# Patient Record
Sex: FEMALE | Race: WHITE | NOT HISPANIC OR LATINO | Employment: OTHER | URBAN - NONMETROPOLITAN AREA
[De-identification: names, ages, dates, MRNs, and addresses within clinical notes are randomized per-mention and may not be internally consistent; named-entity substitution may affect disease eponyms.]

---

## 2018-11-21 ENCOUNTER — HISTORICAL (OUTPATIENT)
Dept: ADMINISTRATIVE | Facility: HOSPITAL | Age: 58
End: 2018-11-21

## 2019-01-18 ENCOUNTER — HISTORICAL (OUTPATIENT)
Dept: ADMINISTRATIVE | Facility: HOSPITAL | Age: 59
End: 2019-01-18

## 2019-03-22 ENCOUNTER — HISTORICAL (OUTPATIENT)
Dept: ADMINISTRATIVE | Facility: HOSPITAL | Age: 59
End: 2019-03-22

## 2019-10-11 ENCOUNTER — HISTORICAL (OUTPATIENT)
Dept: ADMINISTRATIVE | Facility: HOSPITAL | Age: 59
End: 2019-10-11

## 2019-10-14 ENCOUNTER — HISTORICAL (OUTPATIENT)
Dept: ADMINISTRATIVE | Facility: HOSPITAL | Age: 59
End: 2019-10-14

## 2020-08-18 ENCOUNTER — HISTORICAL (OUTPATIENT)
Dept: ADMINISTRATIVE | Facility: HOSPITAL | Age: 60
End: 2020-08-18

## 2020-09-25 ENCOUNTER — HISTORICAL (OUTPATIENT)
Dept: ADMINISTRATIVE | Facility: HOSPITAL | Age: 60
End: 2020-09-25

## 2021-08-20 ENCOUNTER — HISTORICAL (OUTPATIENT)
Dept: ADMINISTRATIVE | Facility: HOSPITAL | Age: 61
End: 2021-08-20

## 2021-10-21 ENCOUNTER — HISTORICAL (OUTPATIENT)
Dept: ADMINISTRATIVE | Facility: HOSPITAL | Age: 61
End: 2021-10-21

## 2022-11-02 ENCOUNTER — HOSPITAL ENCOUNTER (OUTPATIENT)
Dept: WOUND CARE | Facility: HOSPITAL | Age: 62
Discharge: HOME | End: 2022-11-02
Attending: FAMILY MEDICINE
Payer: MEDICARE

## 2022-11-02 DIAGNOSIS — T63.301D SPIDER BITE WOUND, ACCIDENTAL OR UNINTENTIONAL, SUBSEQUENT ENCOUNTER: Primary | ICD-10-CM

## 2022-11-02 PROCEDURE — 10060 I&D ABSCESS SIMPLE/SINGLE: CPT

## 2022-11-02 PROCEDURE — 11042 DBRDMT SUBQ TIS 1ST 20SQCM/<: CPT

## 2022-11-02 PROCEDURE — 27000999 HC MEDICAL RECORD PHOTO DOCUMENTATION

## 2022-11-02 PROCEDURE — 99213 OFFICE O/P EST LOW 20 MIN: CPT | Mod: 25

## 2022-11-02 RX ORDER — LAMOTRIGINE 150 MG/1
TABLET ORAL
COMMUNITY
Start: 2022-10-25

## 2022-11-02 RX ORDER — CLINDAMYCIN HYDROCHLORIDE 300 MG/1
CAPSULE ORAL
COMMUNITY
Start: 2022-10-31 | End: 2022-11-09 | Stop reason: ALTCHOICE

## 2022-11-02 RX ORDER — BUSPIRONE HYDROCHLORIDE 15 MG/1
15 TABLET ORAL 3 TIMES DAILY
COMMUNITY
Start: 2022-10-25

## 2022-11-02 RX ORDER — ALBUTEROL SULFATE 90 UG/1
AEROSOL, METERED RESPIRATORY (INHALATION)
COMMUNITY

## 2022-11-02 RX ORDER — CITALOPRAM 40 MG/1
40 TABLET, FILM COATED ORAL EVERY MORNING
COMMUNITY
Start: 2022-10-25

## 2022-11-02 RX ORDER — SULFAMETHOXAZOLE AND TRIMETHOPRIM 800; 160 MG/1; MG/1
TABLET ORAL
COMMUNITY
Start: 2022-10-31 | End: 2022-11-09 | Stop reason: ALTCHOICE

## 2022-11-02 RX ORDER — SUCRALFATE 1 G/1
1 TABLET ORAL DAILY
COMMUNITY
Start: 2022-09-20

## 2022-11-02 RX ORDER — TRAZODONE HYDROCHLORIDE 100 MG/1
TABLET ORAL
COMMUNITY
Start: 2022-10-25

## 2022-11-02 RX ORDER — IBUPROFEN 800 MG/1
TABLET ORAL
COMMUNITY
Start: 2022-10-31

## 2022-11-02 RX ORDER — ARIPIPRAZOLE 2 MG/1
TABLET ORAL
COMMUNITY

## 2022-11-02 RX ORDER — OLANZAPINE 2.5 MG/1
TABLET ORAL
COMMUNITY

## 2022-11-02 NOTE — PROGRESS NOTES
Subjective:       Patient ID: Kayla Gillis is a 62 y.o. female.    Chief Complaint: Wound Care (Right wrist (spider bite))    HPI    61yo F with PMHx of GERD, COPD, depression & anxiety sent from PCP in [place] directly from clinic today for spider bite to right wrist.     Reports spider bite (not seen) while carrying wood a week ago. Patient has been on Bactrim DS & clindamycin with worsening pain and swelling.     Wound today with two distinct open wounds with yellow purulent material central to significant erythema and tenderness.    I&D performed connecting two puncture wounds - significant necrotic tissue/ slough removed. Will pack with hypertonic saline moistened gauze daily.     Review of Systems   Constitutional:  Negative for chills and fever.   Respiratory:  Negative for cough.    Cardiovascular:  Negative for chest pain.   Skin:  Positive for wound.       Objective:      Vitals:    11/03/22 0849   BP: 128/67   Pulse: 78   Resp: 18       Physical Exam  Vitals reviewed.   Constitutional:       Appearance: Normal appearance.   HENT:      Head: Normocephalic and atraumatic.   Eyes:      Extraocular Movements: Extraocular movements intact.      Pupils: Pupils are equal, round, and reactive to light.   Cardiovascular:      Rate and Rhythm: Normal rate.   Pulmonary:      Effort: Pulmonary effort is normal.   Skin:     Comments: Wound as described and pictured   Neurological:      Mental Status: She is alert.          Altered Skin Integrity 11/02/22 1454 Right Wrist #1 (Active)   11/02/22 1454   Altered Skin Integrity Present on Admission: yes   Side: Right   Orientation:    Location: Wrist   Wound Number: #1   Is this injury device related?: No   Primary Wound Type:    Description of Altered Skin Integrity:    Ankle-Brachial Index:    Pulses:    Removal Indication and Assessment:    Wound Outcome:    (Retired) Wound Length (cm):    (Retired) Wound Width (cm):    (Retired) Depth (cm):    Wound Description  (Comments):    Removal Indications:    Dressing Appearance Moist drainage 11/02/22 1454   Drainage Amount Moderate 11/02/22 1454   Drainage Characteristics/Odor Serosanguineous;Purulent 11/02/22 1454   Appearance Tan;Slough 11/02/22 1454   Tissue loss description Full thickness 11/02/22 1454   Periwound Area Intact;Swelling;Redness 11/02/22 1454   Wound Edges Open 11/02/22 1454   Wound Length (cm) 2.3 cm 11/02/22 1454   Wound Width (cm) 2.4 cm 11/02/22 1454   Wound Depth (cm) 0.2 cm 11/02/22 1454   Wound Volume (cm^3) 1.104 cm^3 11/02/22 1454   Wound Surface Area (cm^2) 5.52 cm^2 11/02/22 1454   Care Cleansed with:;Wound cleanser;Debrided 11/02/22 1454   Dressing Applied;Other (comment) 11/02/22 1454   Dressing Change Due 11/03/22 11/02/22 1454                Pre       Post        Assessment:         ICD-10-CM ICD-9-CM   1. Spider bite wound, accidental or unintentional, subsequent encounter  T63.301D V58.89     989.5           Procedures:           [x] Yes [] No   I & D performed  [] Yes [x] No   Excisional debridement performed  [] Yes [x] No   Selective debridement performed           [] Yes [x] No   Mechanical debridement performed         [] Yes [x] No  Silver nitrate applied                                     [] Yes [x] No  Labs ordered this visit                                  [] Yes [x] No   Imaging ordered this visit                           [] Yes [x] No   Tissue pathology and/or culture taken       Incision and Drainage    Date/Time: 11/2/2022 2:20 PM  Performed by: Adali Ribeiro MD  Authorized by: Adali Ribeiro MD   Consent Done: Not Needed  Type: abscess  Body area: upper extremity  Location details: right wrist    Anesthesia:  Local Anesthetic: topical anesthetic and lidocaine 2% without epinephrine    Patient sedated: no  Incision type: single straight  Complexity: simple  Drainage characteristics: necrotic tissue, slough.  Wound treatment: wound packed  Packing material: 1/4 in  "gauze  Complications: No  Estimated blood loss (mL): 0  Specimens: No  Implants: No  Patient tolerance: Patient tolerated the procedure well with no immediate complications        MEDICATIONS    Current Outpatient Medications:     albuterol (PROVENTIL/VENTOLIN HFA) 90 mcg/actuation inhaler, Ventolin HFA 90 mcg/actuation aerosol inhaler, Disp: , Rfl:     ARIPiprazole (ABILIFY) 2 MG Tab, aripiprazole 2 mg tablet, Disp: , Rfl:     busPIRone (BUSPAR) 15 MG tablet, Take 15 mg by mouth 3 (three) times daily., Disp: , Rfl:     citalopram (CELEXA) 40 MG tablet, Take 40 mg by mouth every morning., Disp: , Rfl:     clindamycin (CLEOCIN) 300 MG capsule, 1 CAPSULE BY MOUTH THREE TIMES A DAY, Disp: , Rfl:     fluticasone-umeclidin-vilanter (TRELEGY ELLIPTA) 100-62.5-25 mcg DsDv, Inhale 1 puff into the lungs once daily., Disp: , Rfl:     ibuprofen (ADVIL,MOTRIN) 800 MG tablet, 1 TABLET BY MOUTH TWICE A DAY AS NEEDED FOR PAIN, Disp: , Rfl:     lamoTRIgine (LAMICTAL) 150 MG Tab, TAKE 2 TABLET AT BEDTIME, Disp: , Rfl:     OLANZapine (ZYPREXA) 2.5 MG tablet, olanzapine 2.5 mg tablet, Disp: , Rfl:     sucralfate (CARAFATE) 1 gram tablet, Take 1 g by mouth once daily., Disp: , Rfl:     sulfamethoxazole-trimethoprim 800-160mg (BACTRIM DS) 800-160 mg Tab, 1 BY MOUTH TWO TIMES A DAY X7 DAYS, Disp: , Rfl:     traZODone (DESYREL) 100 MG tablet, TAKE 2 OR 3 TABLET BY MOUTH EVERY NIGHT AS DIRECTED, Disp: , Rfl:  Review of patient's allergies indicates:  No Known Allergies    DIAGNOSTICS        HOME HEALTH AGENCY:  referred today  TIMES PER WEEK/DAYS:  daily  WOUND CARE ORDERS: Right wrist wound: Cleanse with hypertonic saline, pack with 1/2" hypertonic saline moistened packing strip, cover with bandage to be changed daily      Follow up in about 1 week (around 11/9/2022) for right wrist.            "

## 2022-11-03 VITALS
HEIGHT: 59 IN | RESPIRATION RATE: 18 BRPM | BODY MASS INDEX: 19.56 KG/M2 | HEART RATE: 78 BPM | DIASTOLIC BLOOD PRESSURE: 67 MMHG | SYSTOLIC BLOOD PRESSURE: 128 MMHG | WEIGHT: 97 LBS

## 2022-11-09 ENCOUNTER — HOSPITAL ENCOUNTER (OUTPATIENT)
Dept: WOUND CARE | Facility: HOSPITAL | Age: 62
Discharge: HOME | End: 2022-11-09
Attending: NURSE PRACTITIONER
Payer: MEDICARE

## 2022-11-09 VITALS
HEART RATE: 81 BPM | RESPIRATION RATE: 18 BRPM | DIASTOLIC BLOOD PRESSURE: 69 MMHG | SYSTOLIC BLOOD PRESSURE: 141 MMHG | HEIGHT: 59 IN | BODY MASS INDEX: 19.56 KG/M2 | WEIGHT: 97 LBS

## 2022-11-09 DIAGNOSIS — T63.301D SPIDER BITE WOUND, ACCIDENTAL OR UNINTENTIONAL, SUBSEQUENT ENCOUNTER: Primary | ICD-10-CM

## 2022-11-09 PROBLEM — T63.301A SPIDER BITE: Status: ACTIVE | Noted: 2022-11-09

## 2022-11-09 PROCEDURE — 99213 OFFICE O/P EST LOW 20 MIN: CPT

## 2022-11-09 PROCEDURE — 27000999 HC MEDICAL RECORD PHOTO DOCUMENTATION

## 2022-11-09 NOTE — PROCEDURES
"Debridement    Date/Time: 11/9/2022 12:43 PM  Performed by: Carla Victor NP  Authorized by: Carla Victor NP     Time out: Immediately prior to procedure a "time out" was called to verify the correct patient, procedure, equipment, support staff and site/side marked as required.    Consent Done?:  Yes (Verbal)  Local anesthesia used?: No      Wound Details:    Location:  Right hand (Wrist)    Type of Debridement:  Non-excisional       Length (cm):  1.1       Area (sq cm):  0.99       Width (cm):  0.9       Percent Debrided (%):  100       Depth (cm):  0.2       Total Area Debrided (sq cm):  0.99    Depth of debridement:  Epidermis/Dermis    Devitalized tissue debrided:  Biofilm, Callus, Exudate and Fibrin    Instruments:  Forceps, Scissors and Curette (Maame)     PILAR  "

## 2022-11-09 NOTE — PROGRESS NOTES
Subjective:       Patient ID: Kayla Gillis is a 62 y.o. female.    Chief Complaint: Wound Care (Right wrist (spider bite))    HPI    61yo F with PMHx of GERD, COPD, depression & anxiety sent from PCP in [place] directly from clinic today for spider bite to right wrist.     Reports spider bite (not seen) while carrying wood a week ago. Patient has completed Bactrim DS & clindamycin PO abx.    Today the wound was selectively debrided.  The wound bed does have thickened layer of slough that was not easily removed.  We will begin laying mesalt onto the wound bed to assist with autolytic debridement.  If that does not resolve, and breakthrough to the granular tissue, we will consider adding Santyl to the wound orders.    Review of Systems   Constitutional:  Negative for chills and fever.   Respiratory:  Negative for cough.    Cardiovascular:  Negative for chest pain.   Skin:  Positive for wound.       Objective:      Vitals:    11/09/22 1316   BP: (!) 141/69   Pulse: 81   Resp: 18       Physical Exam  Vitals reviewed.   Constitutional:       Appearance: Normal appearance.   HENT:      Head: Normocephalic and atraumatic.   Eyes:      Extraocular Movements: Extraocular movements intact.      Pupils: Pupils are equal, round, and reactive to light.   Cardiovascular:      Rate and Rhythm: Normal rate.   Pulmonary:      Effort: Pulmonary effort is normal.   Skin:     Comments: Wound as described and pictured   Neurological:      Mental Status: She is alert.          Altered Skin Integrity 11/02/22 1454 Right Wrist #1 (Active)   11/02/22 1454   Altered Skin Integrity Present on Admission: yes   Side: Right   Orientation:    Location: Wrist   Wound Number: #1   Is this injury device related?: No   Primary Wound Type:    Description of Altered Skin Integrity:    Ankle-Brachial Index:    Pulses:    Removal Indication and Assessment:    Wound Outcome:    (Retired) Wound Length (cm):    (Retired) Wound Width (cm):    (Retired) Depth  "(cm):    Wound Description (Comments):    Removal Indications:    Dressing Appearance Dried drainage 11/09/22 1317   Drainage Amount Moderate 11/09/22 1317   Drainage Characteristics/Odor Serosanguineous 11/09/22 1317   Appearance Pink;Slough;Moist 11/09/22 1317   Tissue loss description Full thickness 11/09/22 1317   Periwound Area Intact 11/09/22 1317   Wound Edges Open 11/09/22 1317   Wound Length (cm) 1.1 cm 11/09/22 1317   Wound Width (cm) 0.9 cm 11/09/22 1317   Wound Depth (cm) 0.2 cm 11/09/22 1317   Wound Volume (cm^3) 0.198 cm^3 11/09/22 1317   Wound Surface Area (cm^2) 0.99 cm^2 11/09/22 1317   Care Cleansed with:;Wound cleanser 11/09/22 1317   Dressing Applied 11/09/22 1317   Dressing Change Due 11/10/22 11/09/22 1317                  Right wrist, Pre    Post  mesalt, calcium alginate, 4x4 gauze, kerlix, coban  ML    Assessment:         ICD-10-CM ICD-9-CM   1. Spider bite wound, accidental or unintentional, subsequent encounter  T63.301D V58.89     989.5           Procedures:     Debridement     Date/Time: 11/9/2022 12:43 PM  Performed by: Carla Victor NP  Authorized by: Carla Victor NP      Time out: Immediately prior to procedure a "time out" was called to verify the correct patient, procedure, equipment, support staff and site/side marked as required.     Consent Done?:  Yes (Verbal)  Local anesthesia used?: No       Wound Details:    Location:  Right hand (Wrist)    Type of Debridement:  Non-excisional       Length (cm):  1.1       Area (sq cm):  0.99       Width (cm):  0.9       Percent Debrided (%):  100       Depth (cm):  0.2       Total Area Debrided (sq cm):  0.99    Depth of debridement:  Epidermis/Dermis    Devitalized tissue debrided:  Biofilm, Callus, Exudate and Fibrin    Instruments:  Forceps, Scissors and Curette (Maame)      ML    [] Yes [x] No   I & D performed  [] Yes [x] No   Excisional debridement performed  [x] Yes [] No   Selective debridement performed           [] Yes " [x] No   Mechanical debridement performed         [] Yes [x] No  Silver nitrate applied                                     [] Yes [x] No  Labs ordered this visit                                  [] Yes [x] No   Imaging ordered this visit                           [] Yes [x] No   Tissue pathology and/or culture taken       MEDICATIONS    Current Outpatient Medications:     albuterol (PROVENTIL/VENTOLIN HFA) 90 mcg/actuation inhaler, Ventolin HFA 90 mcg/actuation aerosol inhaler, Disp: , Rfl:     ARIPiprazole (ABILIFY) 2 MG Tab, aripiprazole 2 mg tablet, Disp: , Rfl:     busPIRone (BUSPAR) 15 MG tablet, Take 15 mg by mouth 3 (three) times daily., Disp: , Rfl:     citalopram (CELEXA) 40 MG tablet, Take 40 mg by mouth every morning., Disp: , Rfl:     fluticasone-umeclidin-vilanter (TRELEGY ELLIPTA) 100-62.5-25 mcg DsDv, Inhale 1 puff into the lungs once daily., Disp: , Rfl:     ibuprofen (ADVIL,MOTRIN) 800 MG tablet, 1 TABLET BY MOUTH TWICE A DAY AS NEEDED FOR PAIN, Disp: , Rfl:     lamoTRIgine (LAMICTAL) 150 MG Tab, TAKE 2 TABLET AT BEDTIME, Disp: , Rfl:     OLANZapine (ZYPREXA) 2.5 MG tablet, olanzapine 2.5 mg tablet, Disp: , Rfl:     sucralfate (CARAFATE) 1 gram tablet, Take 1 g by mouth once daily., Disp: , Rfl:     traZODone (DESYREL) 100 MG tablet, TAKE 2 OR 3 TABLET BY MOUTH EVERY NIGHT AS DIRECTED, Disp: , Rfl:  Review of patient's allergies indicates:  No Known Allergies    DIAGNOSTICS        HOME HEALTH AGENCY:  [facility]  TIMES PER WEEK/DAYS:  daily  WOUND CARE ORDERS:     Right wrist wound: Cleanse with wound cleanser and apply mesalt to the wound bed, cover with calcium alginate and a dry dressing - to be changed daily.     Follow up in about 1 week (around 11/16/2022) for Right wrist wound .

## 2022-11-16 ENCOUNTER — HOSPITAL ENCOUNTER (OUTPATIENT)
Dept: WOUND CARE | Facility: HOSPITAL | Age: 62
Discharge: HOME | End: 2022-11-16
Attending: FAMILY MEDICINE
Payer: MEDICARE

## 2022-11-16 VITALS
HEART RATE: 78 BPM | DIASTOLIC BLOOD PRESSURE: 93 MMHG | BODY MASS INDEX: 19.56 KG/M2 | RESPIRATION RATE: 18 BRPM | WEIGHT: 97 LBS | SYSTOLIC BLOOD PRESSURE: 147 MMHG | HEIGHT: 59 IN

## 2022-11-16 DIAGNOSIS — T63.301D SPIDER BITE WOUND, ACCIDENTAL OR UNINTENTIONAL, SUBSEQUENT ENCOUNTER: Primary | ICD-10-CM

## 2022-11-16 PROCEDURE — 97597 DBRDMT OPN WND 1ST 20 CM/<: CPT

## 2022-11-16 PROCEDURE — 99213 OFFICE O/P EST LOW 20 MIN: CPT | Mod: 25

## 2022-11-16 PROCEDURE — 27000999 HC MEDICAL RECORD PHOTO DOCUMENTATION

## 2022-11-16 NOTE — PROGRESS NOTES
Subjective:       Patient ID: Kayla Gillis is a 62 y.o. female.    Chief Complaint: Wound Care (Right wrist (spider bite))    HPI    63yo F with PMHx of GERD, COPD, depression & anxiety sent from PCP in [place] directly from clinic 11/02/22 for spider bite to right wrist.     Reports spider bite (not seen) while carrying wood a week ago. Patient has completed Bactrim DS & clindamycin.      Wound originally with two distinct open wounds with yellow purulent material central to significant erythema and tenderness.I&D performed connecting two puncture wounds - significant necrotic tissue/ slough removed.     Was packing with hypertonic saline moistened gauze daily with Home Health. Last week changed to mesalt for slough.     Wound greatly improved today, minimal slough - good erythematous wound base with fibrin - debrided today.     Change to collagen q3days.     Review of Systems   Constitutional:  Negative for chills and fever.   Respiratory:  Negative for cough.    Cardiovascular:  Negative for chest pain.   Skin:  Positive for wound.       Objective:      Vitals:    11/16/22 0910   BP: (!) 147/93   Pulse: 78   Resp: 18       Physical Exam  Vitals reviewed.   Constitutional:       Appearance: Normal appearance.   HENT:      Head: Normocephalic and atraumatic.   Eyes:      Extraocular Movements: Extraocular movements intact.      Pupils: Pupils are equal, round, and reactive to light.   Cardiovascular:      Rate and Rhythm: Normal rate.   Pulmonary:      Effort: Pulmonary effort is normal.   Skin:     Comments: Wound as described and pictured   Neurological:      Mental Status: She is alert.          Altered Skin Integrity 11/02/22 1454 Right Wrist #1 (Active)   11/02/22 1454   Altered Skin Integrity Present on Admission: yes   Side: Right   Orientation:    Location: Wrist   Wound Number: #1   Is this injury device related?: No   Primary Wound Type:    Description of Altered Skin Integrity:    Ankle-Brachial Index:   "  Pulses:    Removal Indication and Assessment:    Wound Outcome:    (Retired) Wound Length (cm):    (Retired) Wound Width (cm):    (Retired) Depth (cm):    Wound Description (Comments):    Removal Indications:    Dressing Appearance Moist drainage 11/16/22 0911   Drainage Amount Moderate 11/16/22 0911   Drainage Characteristics/Odor Serosanguineous 11/16/22 0911   Appearance Moist;Pink;Granulating 11/16/22 0911   Tissue loss description Full thickness 11/16/22 0911   Periwound Area Intact 11/16/22 0911   Wound Edges Open 11/16/22 0911   Wound Length (cm) 1 cm 11/16/22 0911   Wound Width (cm) 0.5 cm 11/16/22 0911   Wound Depth (cm) 0.2 cm 11/16/22 0911   Wound Volume (cm^3) 0.1 cm^3 11/16/22 0911   Wound Surface Area (cm^2) 0.5 cm^2 11/16/22 0911   Care Cleansed with:;Wound cleanser;Debrided 11/16/22 0911   Dressing Applied;Other (comment) 11/16/22 0911   Dressing Change Due 11/19/22 11/16/22 0911                Pre       Post      11/9/22 11/16/22         Pre       Post        Assessment:         ICD-10-CM ICD-9-CM   1. Spider bite wound, accidental or unintentional, subsequent encounter  T63.301D V58.89     989.5           Procedures:           [] Yes [x] No   I & D performed  [] Yes [x] No   Excisional debridement performed  [x] Yes [] No   Selective debridement performed           [] Yes [x] No   Mechanical debridement performed         [] Yes [x] No  Silver nitrate applied                                     [] Yes [x] No  Labs ordered this visit                                  [] Yes [x] No   Imaging ordered this visit                           [] Yes [x] No   Tissue pathology and/or culture taken         Debridement    Date/Time: 11/16/2022 8:23 AM  Performed by: Adali Ribeiro MD  Authorized by: Adali Ribeiro MD     Time out: Immediately prior to procedure a "time out" was called to verify the correct patient, procedure, equipment, support staff and site/side marked as required.    Consent " Done?:  Yes (Verbal)  Local anesthesia used?: No      Wound Details:    Location:  Right arm    Type of Debridement:  Non-excisional       Length (cm):  1       Area (sq cm):  0.5       Width (cm):  0.5       Percent Debrided (%):  100       Depth (cm):  0.2       Total Area Debrided (sq cm):  0.5    Depth of debridement:  Epidermis/Dermis    Devitalized tissue debrided:  Exudate, Fibrin and Slough    Instruments:  Forceps    Bleeding:  None  Patient tolerance:  Patient tolerated the procedure well with no immediate complications    MEDICATIONS    Current Outpatient Medications:     albuterol (PROVENTIL/VENTOLIN HFA) 90 mcg/actuation inhaler, Ventolin HFA 90 mcg/actuation aerosol inhaler, Disp: , Rfl:     ARIPiprazole (ABILIFY) 2 MG Tab, aripiprazole 2 mg tablet, Disp: , Rfl:     busPIRone (BUSPAR) 15 MG tablet, Take 15 mg by mouth 3 (three) times daily., Disp: , Rfl:     citalopram (CELEXA) 40 MG tablet, Take 40 mg by mouth every morning., Disp: , Rfl:     fluticasone-umeclidin-vilanter (TRELEGY ELLIPTA) 100-62.5-25 mcg DsDv, Inhale 1 puff into the lungs once daily., Disp: , Rfl:     ibuprofen (ADVIL,MOTRIN) 800 MG tablet, 1 TABLET BY MOUTH TWICE A DAY AS NEEDED FOR PAIN, Disp: , Rfl:     lamoTRIgine (LAMICTAL) 150 MG Tab, TAKE 2 TABLET AT BEDTIME, Disp: , Rfl:     OLANZapine (ZYPREXA) 2.5 MG tablet, olanzapine 2.5 mg tablet, Disp: , Rfl:     sucralfate (CARAFATE) 1 gram tablet, Take 1 g by mouth once daily., Disp: , Rfl:     traZODone (DESYREL) 100 MG tablet, TAKE 2 OR 3 TABLET BY MOUTH EVERY NIGHT AS DIRECTED, Disp: , Rfl:  Review of patient's allergies indicates:  No Known Allergies    DIAGNOSTICS        HOME HEALTH AGENCY:  [facility] 11/16/22  TIMES PER WEEK/DAYS:    WOUND CARE ORDERS: Right wrist wound: Cleanse with wound cleanser, apply collagen and bandage to be changed every three days      Follow up in 2 weeks (on 11/30/2022) for right wrist .

## 2023-03-28 ENCOUNTER — HOSPITAL ENCOUNTER (EMERGENCY)
Facility: HOSPITAL | Age: 63
Discharge: HOME | End: 2023-03-28
Attending: FAMILY MEDICINE
Payer: MEDICARE

## 2023-03-28 VITALS
BODY MASS INDEX: 17.37 KG/M2 | TEMPERATURE: 98 F | HEART RATE: 84 BPM | HEIGHT: 61 IN | OXYGEN SATURATION: 97 % | SYSTOLIC BLOOD PRESSURE: 149 MMHG | DIASTOLIC BLOOD PRESSURE: 83 MMHG | RESPIRATION RATE: 16 BRPM | WEIGHT: 92 LBS

## 2023-03-28 DIAGNOSIS — R07.9 CHEST PAIN: ICD-10-CM

## 2023-03-28 DIAGNOSIS — R10.13 EPIGASTRIC PAIN: ICD-10-CM

## 2023-03-28 DIAGNOSIS — R10.13 ABDOMINAL WALL PAIN IN EPIGASTRIC REGION: Primary | ICD-10-CM

## 2023-03-28 LAB
ALBUMIN SERPL-MCNC: 4 G/DL (ref 3.4–5)
ALBUMIN/GLOB SERPL: 1.5 RATIO
ALP SERPL-CCNC: 81 UNIT/L (ref 50–144)
ALT SERPL-CCNC: 15 UNIT/L (ref 1–45)
ANION GAP SERPL CALC-SCNC: 0 MEQ/L (ref 2–13)
AST SERPL-CCNC: 22 UNIT/L (ref 14–36)
BASOPHILS # BLD AUTO: 0.03 X10(3)/MCL (ref 0.01–0.08)
BASOPHILS NFR BLD AUTO: 0.4 % (ref 0.1–1.2)
BILIRUBIN DIRECT+TOT PNL SERPL-MCNC: 0.4 MG/DL (ref 0–1)
BUN SERPL-MCNC: 12 MG/DL (ref 7–20)
CALCIUM SERPL-MCNC: 8.8 MG/DL (ref 8.4–10.2)
CHLORIDE SERPL-SCNC: 105 MMOL/L (ref 98–110)
CO2 SERPL-SCNC: 32 MMOL/L (ref 21–32)
CREAT SERPL-MCNC: 0.59 MG/DL (ref 0.66–1.25)
CREAT/UREA NIT SERPL: 20 (ref 12–20)
EOSINOPHIL # BLD AUTO: 0.11 X10(3)/MCL (ref 0.04–0.36)
EOSINOPHIL NFR BLD AUTO: 1.5 % (ref 0.7–7)
ERYTHROCYTE [DISTWIDTH] IN BLOOD BY AUTOMATED COUNT: 12.3 % (ref 11–14.5)
GFR SERPLBLD CREATININE-BSD FMLA CKD-EPI: >90 MLS/MIN/1.73/M2
GLOBULIN SER-MCNC: 2.7 GM/DL (ref 2–3.9)
GLUCOSE SERPL-MCNC: 97 MG/DL (ref 70–115)
HCT VFR BLD AUTO: 40.3 % (ref 36–48)
HGB BLD-MCNC: 13.4 G/DL (ref 11.8–16)
IMM GRANULOCYTES # BLD AUTO: 0.03 X10(3)/MCL (ref 0–0.03)
IMM GRANULOCYTES NFR BLD AUTO: 0.4 % (ref 0–0.5)
LIPASE SERPL-CCNC: 88 U/L (ref 23–300)
LYMPHOCYTES # BLD AUTO: 2.67 X10(3)/MCL (ref 1.16–3.74)
LYMPHOCYTES NFR BLD AUTO: 36.1 % (ref 20–55)
MCH RBC QN AUTO: 29.9 PG (ref 27–34)
MCV RBC AUTO: 90 FL (ref 79–99)
MEAN CELL HEMOGLOBIN CONCENTRATION (OHS) G/DL: 33.3 G/DL (ref 31–37)
MONOCYTES # BLD AUTO: 0.45 X10(3)/MCL (ref 0.24–0.36)
MONOCYTES NFR BLD AUTO: 6.1 % (ref 4.7–12.5)
NEUTROPHILS # BLD AUTO: 4.11 X10(3)/MCL (ref 1.56–6.13)
NEUTROPHILS NFR BLD AUTO: 55.5 % (ref 37–73)
NRBC BLD AUTO-RTO: 0 % (ref 0–1)
PLATELET # BLD AUTO: 341 X10(3)/MCL (ref 140–371)
PMV BLD AUTO: 8.4 FL (ref 9.4–12.4)
POTASSIUM SERPL-SCNC: 3.4 MMOL/L (ref 3.5–5.1)
PROT SERPL-MCNC: 6.7 GM/DL (ref 6.3–8.2)
RBC # BLD AUTO: 4.48 X10(6)/MCL (ref 4–5.1)
SODIUM SERPL-SCNC: 137 MMOL/L (ref 135–145)
TROPONIN I SERPL-MCNC: <0.012 NG/ML (ref 0–0.03)
WBC # SPEC AUTO: 7.4 X10(3)/MCL (ref 4–11.5)

## 2023-03-28 PROCEDURE — 93010 ELECTROCARDIOGRAM REPORT: CPT | Mod: ,,, | Performed by: INTERNAL MEDICINE

## 2023-03-28 PROCEDURE — 85025 COMPLETE CBC W/AUTO DIFF WBC: CPT | Performed by: FAMILY MEDICINE

## 2023-03-28 PROCEDURE — 96374 THER/PROPH/DIAG INJ IV PUSH: CPT

## 2023-03-28 PROCEDURE — 93005 ELECTROCARDIOGRAM TRACING: CPT

## 2023-03-28 PROCEDURE — 80053 COMPREHEN METABOLIC PANEL: CPT | Performed by: FAMILY MEDICINE

## 2023-03-28 PROCEDURE — 25000003 PHARM REV CODE 250: Performed by: FAMILY MEDICINE

## 2023-03-28 PROCEDURE — 99285 EMERGENCY DEPT VISIT HI MDM: CPT | Mod: 25

## 2023-03-28 PROCEDURE — 25500020 PHARM REV CODE 255: Performed by: FAMILY MEDICINE

## 2023-03-28 PROCEDURE — 63600175 PHARM REV CODE 636 W HCPCS: Performed by: FAMILY MEDICINE

## 2023-03-28 PROCEDURE — 84484 ASSAY OF TROPONIN QUANT: CPT | Performed by: FAMILY MEDICINE

## 2023-03-28 PROCEDURE — 83690 ASSAY OF LIPASE: CPT | Performed by: FAMILY MEDICINE

## 2023-03-28 PROCEDURE — 96361 HYDRATE IV INFUSION ADD-ON: CPT

## 2023-03-28 PROCEDURE — 36415 COLL VENOUS BLD VENIPUNCTURE: CPT | Performed by: FAMILY MEDICINE

## 2023-03-28 PROCEDURE — C9113 INJ PANTOPRAZOLE SODIUM, VIA: HCPCS | Performed by: FAMILY MEDICINE

## 2023-03-28 PROCEDURE — 93010 EKG 12-LEAD: ICD-10-PCS | Mod: ,,, | Performed by: INTERNAL MEDICINE

## 2023-03-28 RX ORDER — PANTOPRAZOLE SODIUM 40 MG/10ML
40 INJECTION, POWDER, LYOPHILIZED, FOR SOLUTION INTRAVENOUS
Status: COMPLETED | OUTPATIENT
Start: 2023-03-28 | End: 2023-03-28

## 2023-03-28 RX ORDER — SODIUM CHLORIDE 9 MG/ML
500 INJECTION, SOLUTION INTRAVENOUS
Status: COMPLETED | OUTPATIENT
Start: 2023-03-28 | End: 2023-03-28

## 2023-03-28 RX ADMIN — PANTOPRAZOLE SODIUM 40 MG: 40 INJECTION, POWDER, FOR SOLUTION INTRAVENOUS at 11:03

## 2023-03-28 RX ADMIN — SODIUM CHLORIDE 500 ML: 9 INJECTION, SOLUTION INTRAVENOUS at 11:03

## 2023-03-28 RX ADMIN — IOPAMIDOL 100 ML: 755 INJECTION, SOLUTION INTRAVENOUS at 12:03

## 2023-03-28 NOTE — ED PROVIDER NOTES
Encounter Date: 3/28/2023       History     Chief Complaint   Patient presents with    Abdominal Pain     C/o upper abdominal pain x 1 week that is severe today, also reports dark stools and weakness for the past few days.     63-year-old white female presents to the emergency room complaining of upper abdominal/epigastric pain for 1 week.  Patient states she has history of COPD so no change in shortness of breath noted.  Patient states the pain is sharp in his not really affected by eating.  Patient states the pain is in the area of her previous hernia repair.  Patient states he has a occasional nausea but no vomiting or diarrhea.  Patient states her appetite is normal.  Patient denies any stacy chest pain.    The history is provided by the patient.   Review of patient's allergies indicates:  No Known Allergies  Past Medical History:   Diagnosis Date    COPD (chronic obstructive pulmonary disease)     Depression     Stomach ulcer      Past Surgical History:   Procedure Laterality Date    HERNIA REPAIR      HYSTERECTOMY       Family History   Problem Relation Age of Onset    Cancer Mother      Social History     Tobacco Use    Smoking status: Every Day     Types: Cigarettes    Smokeless tobacco: Never   Substance Use Topics    Alcohol use: Never    Drug use: Never     Review of Systems   Gastrointestinal:  Positive for abdominal pain and nausea. Negative for diarrhea and vomiting.   All other systems reviewed and are negative.    Physical Exam     Initial Vitals [03/28/23 1047]   BP Pulse Resp Temp SpO2   (!) 164/86 87 18 98.2 °F (36.8 °C) 97 %      MAP       --         Physical Exam    Nursing note and vitals reviewed.  Constitutional:   Thin white female alert in no acute distress.   HENT:   Head is atraumatic and normocephalic.  Oropharynx is clear with moist mucous membranes.  Nose with no discharge.   Neck:   Neck is supple with full range of motion.   Cardiovascular:            Heart is regular rate and rhythm  without murmur.   Pulmonary/Chest:   Lungs with a few scattered rhonchi but otherwise clear and breath sounds equal bilaterally.   Abdominal:   Abdomen with normal bowel sounds.  Epigastric tenderness is noted with a well-healed surgical incision and palpable mesh at the hernia repair site.  Mild guarding but no rebound.  Remainder of abdomen is soft and nontender.   Musculoskeletal:      Comments: Extremities showed full range of motion throughout with no clubbing cyanosis or edema.     Neurological:   Patient is alert and oriented x3 with normal strength and sensation.   Skin:   Skin is warm and dry.   Psychiatric: She has a normal mood and affect. Her behavior is normal. Thought content normal.       ED Course   Procedures  Labs Reviewed   COMPREHENSIVE METABOLIC PANEL - Abnormal; Notable for the following components:       Result Value    Potassium Level 3.4 (*)     Creatinine 0.59 (*)     Anion Gap 0.0 (*)     All other components within normal limits   CBC WITH DIFFERENTIAL - Abnormal; Notable for the following components:    MPV 8.4 (*)     Mono # 0.45 (*)     All other components within normal limits   LIPASE - Normal   TROPONIN I - Normal   CBC W/ AUTO DIFFERENTIAL    Narrative:     The following orders were created for panel order CBC W/ AUTO DIFFERENTIAL.  Procedure                               Abnormality         Status                     ---------                               -----------         ------                     CBC with Differential[[id number]]        Abnormal            Final result                 Please view results for these tests on the individual orders.     EKG Readings: (Independently Interpreted)   Initial Reading: No STEMI. Rhythm: Normal Sinus Rhythm. Heart Rate: 67. Ectopy: No Ectopy. Conduction: Normal. ST Segments: Normal ST Segments. T Waves: Normal. Axis: Normal. Clinical Impression: Normal Sinus Rhythm     Imaging Results              CT Abdomen Pelvis With Contrast (Final  result)  Result time 03/28/23 12:30:17      Final result by La Oh III, MD (03/28/23 12:30:17)                   Impression:      1. Chronic changes are present as described above.  See above comments.      Electronically signed by: La Oh  Date:    03/28/2023  Time:    12:30               Narrative:    EXAMINATION:  CT ABDOMEN PELVIS WITH CONTRAST    CLINICAL HISTORY AND TECHNIQUE:  Marta Hdz, RT on 3/28/2023 12:24 PM    PT STATUS: ER    PROCEDURE: CT ABD/PELVIS W/ IV    CLINICAL HX: MID EPIGASTRIC PAIN X'S SEVERAL MONTHS, WORSE TODAY. ALSO REPORTS DARK STOOLS & WEAKNESS FOR A FEW DAYS    PMH: GASTRIC ULCER, HIATAL HERNIA REPAIR, PARTIAL HYSTO, CRUZ    IV CONTRAST: 100CC ISOVUE 370    ORAL CONTRAST: NONE    RECTAL CONTRAST: NONE    AXIAL IMAGES @ 5MM INTERVALS WITH MULTIPLANAR RECONSTRUCTION    TOTAL IMAGE NUMBER: 192    NUMBER OF CT SCANS IN PAST 12 MONTHS: 2    CTDIvol(mGy): HEAD:     BODY: 6.6    DLP(mGycm): HEAD:     BODY: 270.7    TECH: [facility]    PT TRANSPORTED W/O INCIDENT    This patient has had 2 CT and nuclear medicine scans performed within the last 12 months.    The following DOSE REDUCTION TECHNIQUES are used for all CT scans at [facility]:    1. Automated exposure control.  2. Adjustment of the mA and/or kv according to patient size.  3. Use of iterative reconstruction technique.    COMPARISON:  12/23/2015    FINDINGS:  Liver: A 2-3 cm, benign-appearing parenchymal cyst is noted posteriorly within the right lobe of the liver and of doubtful clinical significance.  No additional parenchymal abnormalities are appreciated.    Gallbladder/biliary system: Previous cholecystectomy.    Spleen: No clinically significant abnormalities are noted.    Adrenal glands: The adrenal glands are less than optimally delineated with no definite abnormalities appreciated.    Pancreas: No clinically significant abnormalities are noted.    Kidneys/ureters: Small (less than 2 cm)  benign-appearing cortical cysts are noted originating from the right kidney and of doubtful clinical significance.    Urinary bladder: No clinically significant abnormalities are noted.    Uterus and ovaries: Soft tissue planes within the pelvis are somewhat obscured by adjacent unopacified bowel.  The patient reports being post hysterectomy with no definite pelvic abnormality is appreciated.    GI tract: Unopacified loops of large and small bowel as well as the gastric lumen are very difficult to evaluate with no definite abnormalities appreciated.  The appendix is not clearly delineated although there are no secondary changes to suggest appendicitis.    Vascular structures: Moderate atherosclerotic plaquing is noted throughout the abdominal aorta is primary branches.    Musculoskeletal structures: There is moderate demineralization of the skeletal structures with a prominent, levoconvex, scoliotic curvature of the lumbar spine and fairly extensive degenerative changes noted throughout the lumbar spine.    Miscellaneous: N/A                                       X-Ray Chest PA And Lateral (Final result)  Result time 03/28/23 12:16:03      Final result by La Oh III, MD (03/28/23 12:16:03)                   Impression:      1. Chronic changes are present including findings compatible with COPD with moderate changes of interstitial fibrosis and scattered areas of mild parenchymal scarring.  2. I see no lobar or segmental infiltrates or other significant abnormalities.      Electronically signed by: aL Oh  Date:    03/28/2023  Time:    12:16               Narrative:    EXAMINATION:  STUDY: XR CHEST PA AND LATERAL    CLINICAL HISTORY AND TECHNIQUE:  RT Jason on 3/28/2023 11:20 AM    CURRENT CLINICAL HISTORY: ER PT    X 1 WEEK    -UPPER ABDOMINAL PAIN    -DARKS STOOLS, WEAKNESS    PAST MEDICAL HISTORY: SMOKER, COPD    TECHNIQUE: 2 VIEWS OF CHEST    TECHNOLOGIST: COREEN ZEPEDA  OK    COMPARISON:  None    FINDINGS:  The lungs are hyperexpanded with slight flattening of the hemidiaphragms, prominence of proximal pulmonary vasculature and increased interstitial lung markings compatible with patient's history of COPD and moderate changes of interstitial fibrosis.The cardiac and mediastinal contours appear unremarkable.  I see no lobar segmental infiltrates.No significant pleural effusions are noted.There is moderate demineralization of the skeletal structures with a moderate, dextroconvex, scoliotic curvature of the thoracic spine and mild to moderate degenerative changes noted throughout the thoracic spine.                                       Medications   0.9%  NaCl infusion (0 mLs Intravenous Stopped 3/28/23 1205)   pantoprazole injection 40 mg (40 mg Intravenous Given 3/28/23 1127)   iopamidoL (ISOVUE-370) injection 100 mL (100 mLs Intravenous Given 3/28/23 1210)     Medical Decision Making:   Initial Assessment:   Abdominal pain  Differential Diagnosis:   Gastritis, abdominal wall pain, ventral hernia, angina,  Clinical Tests:   Lab Tests: Ordered and Reviewed  The following lab test(s) were unremarkable: CBC, CMP, Lipase and Troponin  Radiological Study: Ordered and Reviewed  Medical Tests: Ordered and Reviewed  ED Management:  Discuss all lab and x-ray results with patient.  No acute abnormalities were noted in either lab or CT and chest x-ray results.  Pain is probably due to previous hernia repair with mesh.  Patient will be instructed to follow up with her PCP.                        Clinical Impression:   Final diagnoses:  [R10.13] Epigastric pain  [R07.9] Chest pain  [R10.13] Abdominal wall pain in epigastric region (Primary)        ED Disposition Condition    Discharge Stable          ED Prescriptions    None       Follow-up Information       Follow up With Specialties Details Why Contact Info    Vidal Malhotra MD Internal Medicine Schedule an appointment as soon as  possible for a visit   [address] [facility] [address]  [phone]               Williams Colunga MD  03/28/23 1300

## 2023-08-10 ENCOUNTER — HOSPITAL ENCOUNTER (EMERGENCY)
Facility: HOSPITAL | Age: 63
Discharge: HOME | End: 2023-08-10
Payer: MEDICARE

## 2023-08-10 VITALS
DIASTOLIC BLOOD PRESSURE: 88 MMHG | WEIGHT: 95 LBS | HEIGHT: 63 IN | RESPIRATION RATE: 17 BRPM | SYSTOLIC BLOOD PRESSURE: 159 MMHG | OXYGEN SATURATION: 96 % | TEMPERATURE: 99 F | BODY MASS INDEX: 16.83 KG/M2 | HEART RATE: 92 BPM

## 2023-08-10 DIAGNOSIS — H10.401 CHRONIC CONJUNCTIVITIS OF RIGHT EYE, UNSPECIFIED CHRONIC CONJUNCTIVITIS TYPE: Primary | ICD-10-CM

## 2023-08-10 PROCEDURE — 99283 EMERGENCY DEPT VISIT LOW MDM: CPT

## 2023-08-10 RX ORDER — KETOROLAC TROMETHAMINE 5 MG/ML
1 SOLUTION OPHTHALMIC 4 TIMES DAILY
Qty: 1 EACH | Refills: 0 | Status: SHIPPED | OUTPATIENT
Start: 2023-08-10 | End: 2023-08-20

## 2023-08-10 NOTE — ED PROVIDER NOTES
Encounter Date: 8/10/2023       History     Chief Complaint   Patient presents with    Eye Drainage    Headache     States increase in right eye drainage w/ pain radiating into head x 4 months. Also states some vision changes. State hx of injury to eye w/ a  when 21 and refused a surgery at that time. Denies seeing optometry for eye. Last MD visit was 10 yrs ago. Redness noted to eye. No relief w/ OTC meds     63-year-old female presents complaining of right eye irritation 4 years, worsening for the past 4 months.  She reportedly had an injury to the eye when she was only 21 years old.  The eyes bothered her ever since.  Has been getting gradually worse in the last 4 months.  Photophobia seems to be her biggest problem with the eye.  She has not seen an ophthalmologist in about 10 years.    The history is provided by the patient.     Review of patient's allergies indicates:  No Known Allergies  Past Medical History:   Diagnosis Date    COPD (chronic obstructive pulmonary disease)     Depression     Stomach ulcer      Past Surgical History:   Procedure Laterality Date    HERNIA REPAIR      HYSTERECTOMY       Family History   Problem Relation Age of Onset    Cancer Mother      Social History     Tobacco Use    Smoking status: Every Day     Current packs/day: 0.00     Types: Cigarettes    Smokeless tobacco: Never   Substance Use Topics    Alcohol use: Never    Drug use: Never     Review of Systems   Constitutional:  Negative for fever.   HENT:  Negative for sore throat.    Eyes:  Positive for photophobia, pain, discharge, redness, itching and visual disturbance.   Respiratory:  Negative for shortness of breath.    Cardiovascular:  Negative for chest pain.   Gastrointestinal:  Negative for nausea.   Genitourinary:  Negative for dysuria.   Musculoskeletal:  Negative for back pain.   Skin:  Negative for rash.   Neurological:  Negative for weakness.   Hematological:  Does not bruise/bleed easily.   All other systems  reviewed and are negative.      Physical Exam     Initial Vitals [08/10/23 1512]   BP Pulse Resp Temp SpO2   (!) 159/88 92 17 98.6 °F (37 °C) 96 %      MAP       --         Physical Exam    Nursing note and vitals reviewed.  Constitutional: Vital signs are normal. She appears well-developed and well-nourished. She is cooperative.   HENT:   Head: Normocephalic and atraumatic.   Eyes: EOM and lids are normal. Pupils are equal, round, and reactive to light. Right eye exhibits no discharge. Left eye exhibits no discharge. No scleral icterus.   Conjunctiva is bit red on the right, with some mild tearing.  There does not appear to be increased intra-ocular pressure.  There is no tenderness over the eye.   Neck: Trachea normal. Neck supple.   Normal range of motion.  Cardiovascular:  Regular rhythm, normal heart sounds and intact distal pulses.           Musculoskeletal:         General: Normal range of motion.      Cervical back: Normal, normal range of motion and neck supple.      Lumbar back: Normal.     Neurological: She is alert and oriented to person, place, and time. She has normal strength. Coordination normal. GCS score is 15. GCS eye subscore is 4. GCS verbal subscore is 5. GCS motor subscore is 6.   Skin: Skin is warm, dry and intact. Capillary refill takes less than 2 seconds.   Psychiatric: She has a normal mood and affect. Her speech is normal and behavior is normal. Judgment and thought content normal. Cognition and memory are normal.         ED Course   Procedures  Labs Reviewed - No data to display       Imaging Results    None          Medications - No data to display  Medical Decision Making:   Initial Assessment:   Chronic right eye irritation with photophobia  Patient needs ophthalmology eval                          Clinical Impression:   Final diagnoses:  [H10.401] Chronic conjunctivitis of right eye, unspecified chronic conjunctivitis type (Primary)        ED Disposition Condition    Discharge Good           ED Prescriptions       Medication Sig Dispense Start Date End Date Auth. Provider    ketorolac 0.5% (ACULAR) 0.5 % Drop Place 1 drop into the right eye 4 (four) times daily. for 10 days 1 each 8/10/2023 8/20/2023 Rakesh Ramon MD          Follow-up Information    None          Rakesh Ramon MD  08/10/23 5505

## 2023-10-06 ENCOUNTER — HOSPITAL ENCOUNTER (EMERGENCY)
Facility: HOSPITAL | Age: 63
Discharge: HOME | End: 2023-10-06
Attending: FAMILY MEDICINE
Payer: MEDICARE

## 2023-10-06 VITALS
OXYGEN SATURATION: 94 % | BODY MASS INDEX: 18.77 KG/M2 | RESPIRATION RATE: 20 BRPM | SYSTOLIC BLOOD PRESSURE: 169 MMHG | HEART RATE: 85 BPM | DIASTOLIC BLOOD PRESSURE: 92 MMHG | HEIGHT: 62 IN | WEIGHT: 102 LBS | TEMPERATURE: 99 F

## 2023-10-06 DIAGNOSIS — N63.21 MASS OF UPPER OUTER QUADRANT OF LEFT BREAST: Primary | ICD-10-CM

## 2023-10-06 PROCEDURE — 99281 EMR DPT VST MAYX REQ PHY/QHP: CPT

## 2023-10-06 NOTE — ED PROVIDER NOTES
Encounter Date: 10/6/2023       History     Chief Complaint   Patient presents with    Breast Discharge     Pt. Reports bloody discharge and and breast lump. Pt. Reports she self expressed blood from L bleast last night. Reports PMHx of abnormal mammogram that she never followed up with PCP about.      HPI  Review of patient's allergies indicates:  No Known Allergies  Past Medical History:   Diagnosis Date    COPD (chronic obstructive pulmonary disease)     Depression     Stomach ulcer      Past Surgical History:   Procedure Laterality Date    HERNIA REPAIR      HYSTERECTOMY       Family History   Problem Relation Age of Onset    Cancer Mother      Social History     Tobacco Use    Smoking status: Every Day     Types: Cigarettes    Smokeless tobacco: Never   Substance Use Topics    Alcohol use: Never    Drug use: Never     Review of Systems   HENT: Negative.     Respiratory: Negative.     Cardiovascular: Negative.    Skin:         Left nipple with punctate blood noted.  Left breast outer quadrant palpable mass       Physical Exam     Initial Vitals [10/06/23 1154]   BP Pulse Resp Temp SpO2   (!) 187/87 94 18 98.6 °F (37 °C) 99 %      MAP       --         Physical Exam    ED Course   Procedures  Labs Reviewed - No data to display       Imaging Results    None          Medications - No data to display  Medical Decision Making  I called the patient's gyn.  I made her an appointment personally for Monday morning at 9:30 a.m.      Additional MDM:   Differential Diagnosis:   Abscess, mastitis, benign breast                             Clinical Impression:   Final diagnoses:  [N63.21] Mass of upper outer quadrant of left breast (Primary)        ED Disposition Condition    Discharge Stable          ED Prescriptions    None       Follow-up Information    None          Jack Preston MD  10/06/23 4537

## 2023-10-06 NOTE — ED NOTES
\Pt seen and evaluated by Dr. Preston. Pt has no complaints at this time and verbalized understanding of education. Pt ambulated out of ER with steady gait.  No acute distress noted. See provider notes.

## 2023-10-25 ENCOUNTER — HOSPITAL ENCOUNTER (OUTPATIENT)
Dept: RADIOLOGY | Facility: HOSPITAL | Age: 63
Discharge: HOME | End: 2023-10-25
Attending: SURGERY
Payer: MEDICARE

## 2023-10-25 DIAGNOSIS — R92.8 ABNORMAL MAMMOGRAM: ICD-10-CM

## 2023-10-25 PROCEDURE — 76642 ULTRASOUND BREAST LIMITED: CPT | Mod: TC,LT

## 2023-10-25 PROCEDURE — 77062 MAMMO DIGITAL DIAGNOSTIC BILAT WITH TOMO: ICD-10-PCS | Mod: 26,,, | Performed by: STUDENT IN AN ORGANIZED HEALTH CARE EDUCATION/TRAINING PROGRAM

## 2023-10-25 PROCEDURE — 77062 BREAST TOMOSYNTHESIS BI: CPT | Mod: TC

## 2023-10-25 PROCEDURE — 76642 US BREAST LEFT LIMITED: ICD-10-PCS | Mod: 26,LT,, | Performed by: STUDENT IN AN ORGANIZED HEALTH CARE EDUCATION/TRAINING PROGRAM

## 2023-10-25 PROCEDURE — 76642 ULTRASOUND BREAST LIMITED: CPT | Mod: 26,LT,, | Performed by: STUDENT IN AN ORGANIZED HEALTH CARE EDUCATION/TRAINING PROGRAM

## 2023-10-25 PROCEDURE — 77062 BREAST TOMOSYNTHESIS BI: CPT | Mod: 26,,, | Performed by: STUDENT IN AN ORGANIZED HEALTH CARE EDUCATION/TRAINING PROGRAM

## 2023-10-25 PROCEDURE — 77066 MAMMO DIGITAL DIAGNOSTIC BILAT WITH TOMO: ICD-10-PCS | Mod: 26,,, | Performed by: STUDENT IN AN ORGANIZED HEALTH CARE EDUCATION/TRAINING PROGRAM

## 2023-10-25 PROCEDURE — 77066 DX MAMMO INCL CAD BI: CPT | Mod: 26,,, | Performed by: STUDENT IN AN ORGANIZED HEALTH CARE EDUCATION/TRAINING PROGRAM

## 2024-01-03 ENCOUNTER — HOSPITAL ENCOUNTER (OUTPATIENT)
Dept: RADIOLOGY | Facility: HOSPITAL | Age: 64
Discharge: HOME | End: 2024-01-03
Attending: INTERNAL MEDICINE
Payer: MEDICARE

## 2024-01-03 DIAGNOSIS — R05.3 CHRONIC COUGH: ICD-10-CM

## 2024-01-03 PROCEDURE — 71046 X-RAY EXAM CHEST 2 VIEWS: CPT | Mod: TC

## 2024-01-24 ENCOUNTER — HOSPITAL ENCOUNTER (EMERGENCY)
Facility: HOSPITAL | Age: 64
Discharge: HOME | End: 2024-01-24
Attending: FAMILY MEDICINE
Payer: MEDICARE

## 2024-01-24 VITALS
WEIGHT: 101 LBS | BODY MASS INDEX: 16.23 KG/M2 | HEART RATE: 93 BPM | RESPIRATION RATE: 16 BRPM | HEIGHT: 66 IN | SYSTOLIC BLOOD PRESSURE: 163 MMHG | DIASTOLIC BLOOD PRESSURE: 84 MMHG | OXYGEN SATURATION: 96 % | TEMPERATURE: 99 F

## 2024-01-24 DIAGNOSIS — L03.211 FACIAL CELLULITIS: Primary | ICD-10-CM

## 2024-01-24 PROCEDURE — 99284 EMERGENCY DEPT VISIT MOD MDM: CPT

## 2024-01-24 PROCEDURE — 25000003 PHARM REV CODE 250: Performed by: FAMILY MEDICINE

## 2024-01-24 RX ORDER — HYDROCODONE BITARTRATE AND ACETAMINOPHEN 5; 325 MG/1; MG/1
1 TABLET ORAL
Status: COMPLETED | OUTPATIENT
Start: 2024-01-24 | End: 2024-01-24

## 2024-01-24 RX ORDER — CLINDAMYCIN HYDROCHLORIDE 300 MG/1
300 CAPSULE ORAL 3 TIMES DAILY
Qty: 30 CAPSULE | Refills: 0 | Status: SHIPPED | OUTPATIENT
Start: 2024-01-24 | End: 2024-02-03

## 2024-01-24 RX ORDER — CLINDAMYCIN HYDROCHLORIDE 150 MG/1
300 CAPSULE ORAL
Status: COMPLETED | OUTPATIENT
Start: 2024-01-24 | End: 2024-01-24

## 2024-01-24 RX ORDER — HYDROCODONE BITARTRATE AND ACETAMINOPHEN 5; 325 MG/1; MG/1
1 TABLET ORAL EVERY 6 HOURS PRN
Qty: 10 TABLET | Refills: 0 | Status: SHIPPED | OUTPATIENT
Start: 2024-01-24

## 2024-01-24 RX ADMIN — CLINDAMYCIN HYDROCHLORIDE 300 MG: 150 CAPSULE ORAL at 09:01

## 2024-01-24 RX ADMIN — HYDROCODONE BITARTRATE AND ACETAMINOPHEN 1 TABLET: 5; 325 TABLET ORAL at 09:01

## 2024-01-25 NOTE — ED PROVIDER NOTES
"Encounter Date: 1/24/2024       History     Chief Complaint   Patient presents with    Facial Swelling     Pt in per EMS from home with c/o right sided facial swelling increased s/p I&D yesterday.  Pt states "I was bite by a bug 2 days ago".  Currently taking unknown abx.       Patient has a infected bug bite in the right side of her cheek.  Seen by her PCP yesterday and started on some Bactrim.  It was gotten significantly more swollen since then.  Denies any fevers or chills.    The history is provided by the patient.     Review of patient's allergies indicates:  No Known Allergies  Past Medical History:   Diagnosis Date    COPD (chronic obstructive pulmonary disease)     Depression     Stomach ulcer      Past Surgical History:   Procedure Laterality Date    HERNIA REPAIR      HYSTERECTOMY       Family History   Problem Relation Age of Onset    Cancer Mother      Social History     Tobacco Use    Smoking status: Every Day     Types: Cigarettes    Smokeless tobacco: Never   Substance Use Topics    Alcohol use: Never    Drug use: Never     Review of Systems   Constitutional:  Negative for fever.   HENT:  Negative for sore throat.    Respiratory:  Negative for shortness of breath.    Cardiovascular:  Negative for chest pain.   Gastrointestinal:  Negative for nausea.   Genitourinary:  Negative for dysuria.   Musculoskeletal:  Negative for back pain.   Skin:  Negative for rash.   Neurological:  Negative for weakness.   Hematological:  Does not bruise/bleed easily.   All other systems reviewed and are negative.      Physical Exam     Initial Vitals [01/24/24 2109]   BP Pulse Resp Temp SpO2   (!) 163/84 93 17 98.6 °F (37 °C) 96 %      MAP       --         Physical Exam    Nursing note and vitals reviewed.  Constitutional: She appears well-developed and well-nourished.   HENT:   Right cheek with some swelling and erythema over an obvious facial cellulitis.   Eyes: EOM are normal. Pupils are equal, round, and reactive to " light.   Neck: Neck supple.   Normal range of motion.  Cardiovascular:  Normal rate, regular rhythm and normal heart sounds.           Pulmonary/Chest: Breath sounds normal.   Abdominal: Abdomen is soft. Bowel sounds are normal. There is no abdominal tenderness.   Musculoskeletal:         General: No edema. Normal range of motion.      Cervical back: Normal range of motion and neck supple.     Neurological: She is alert and oriented to person, place, and time.   Skin: Skin is warm and dry. Capillary refill takes less than 2 seconds.   Psychiatric: She has a normal mood and affect.         ED Course   Procedures  Labs Reviewed - No data to display       Imaging Results    None          Medications   HYDROcodone-acetaminophen 5-325 mg per tablet 1 tablet (has no administration in time range)   clindamycin capsule 300 mg (has no administration in time range)     Medical Decision Making  Risk  Prescription drug management.                                      Clinical Impression:  Final diagnoses:  [L03.211] Facial cellulitis (Primary)          ED Disposition Condition    Discharge Stable          ED Prescriptions       Medication Sig Dispense Start Date End Date Auth. Provider    HYDROcodone-acetaminophen (NORCO) 5-325 mg per tablet Take 1 tablet by mouth every 6 (six) hours as needed for Pain. 10 tablet 1/24/2024 -- Gagan High MD    clindamycin (CLEOCIN) 300 MG capsule Take 1 capsule (300 mg total) by mouth 3 (three) times daily. for 10 days 30 capsule 1/24/2024 2/3/2024 Gagan High MD          Follow-up Information       Follow up With Specialties Details Why Contact Info    Vidal Malhotra MD Internal Medicine Call  As needed [address] [facility] [address]  [phone]               Gagan High MD  01/24/24 2119

## 2024-03-13 ENCOUNTER — HOSPITAL ENCOUNTER (EMERGENCY)
Facility: HOSPITAL | Age: 64
Discharge: HOME | End: 2024-03-13
Attending: FAMILY MEDICINE
Payer: MEDICARE

## 2024-03-13 VITALS
SYSTOLIC BLOOD PRESSURE: 137 MMHG | BODY MASS INDEX: 16.71 KG/M2 | RESPIRATION RATE: 18 BRPM | HEART RATE: 88 BPM | HEIGHT: 66 IN | OXYGEN SATURATION: 98 % | TEMPERATURE: 98 F | WEIGHT: 104 LBS | DIASTOLIC BLOOD PRESSURE: 78 MMHG

## 2024-03-13 DIAGNOSIS — R06.00 DYSPNEA: ICD-10-CM

## 2024-03-13 DIAGNOSIS — F41.9 ANXIETY DISORDER, UNSPECIFIED TYPE: ICD-10-CM

## 2024-03-13 DIAGNOSIS — J44.1 COPD WITH ACUTE EXACERBATION: Primary | ICD-10-CM

## 2024-03-13 LAB
ALBUMIN SERPL-MCNC: 4.7 G/DL (ref 3.4–5)
ALBUMIN/GLOB SERPL: 1.4 RATIO
ALP SERPL-CCNC: 150 UNIT/L (ref 50–144)
ALT SERPL-CCNC: 76 UNIT/L (ref 1–45)
ANION GAP SERPL CALC-SCNC: 6 MEQ/L (ref 2–13)
AST SERPL-CCNC: 34 UNIT/L (ref 14–36)
BASE EXCESS BLD CALC-SCNC: 5.5 MMOL/L (ref -2–2)
BASOPHILS # BLD AUTO: 0.05 X10(3)/MCL (ref 0.01–0.08)
BASOPHILS NFR BLD AUTO: 0.4 % (ref 0.1–1.2)
BILIRUB SERPL-MCNC: 0.3 MG/DL (ref 0–1)
BLOOD GAS SAMPLE TYPE (OHS): ABNORMAL
BNP BLD-MCNC: 58.8 PG/ML (ref 0–124.9)
BUN SERPL-MCNC: 26 MG/DL (ref 7–20)
CALCIUM SERPL-MCNC: 10.1 MG/DL (ref 8.4–10.2)
CHLORIDE SERPL-SCNC: 103 MMOL/L (ref 98–110)
CO2 SERPL-SCNC: 29 MMOL/L (ref 21–32)
COHGB MFR BLDA: 7.1 % (ref 0–1.5)
CREAT SERPL-MCNC: 0.73 MG/DL (ref 0.66–1.25)
CREAT/UREA NIT SERPL: 36 (ref 12–20)
D DIMER PPP IA.FEU-MCNC: 0.24 MG/L (ref 0.19–0.5)
EOSINOPHIL # BLD AUTO: 0.12 X10(3)/MCL (ref 0.04–0.36)
EOSINOPHIL NFR BLD AUTO: 1 % (ref 0.7–7)
ERYTHROCYTE [DISTWIDTH] IN BLOOD BY AUTOMATED COUNT: 12.9 % (ref 11–14.5)
GFR SERPLBLD CREATININE-BSD FMLA CKD-EPI: >90 MLS/MIN/1.73/M2
GLOBULIN SER-MCNC: 3.4 GM/DL (ref 2–3.9)
GLUCOSE SERPL-MCNC: 104 MG/DL (ref 70–115)
HCO3 BLDA-SCNC: 29.4 MMOL/L (ref 22–26)
HCT VFR BLD AUTO: 42.4 % (ref 36–48)
HGB BLD-MCNC: 13.9 G/DL (ref 11.8–16)
IMM GRANULOCYTES # BLD AUTO: 0.01 X10(3)/MCL (ref 0–0.03)
IMM GRANULOCYTES NFR BLD AUTO: 0.1 % (ref 0–0.5)
INFLUENZA A (OHS): NEGATIVE
INFLUENZA B (OHS): NEGATIVE
INHALED O2 CONCENTRATION: 21 %
LACTATE SERPL-SCNC: 1.4 MMOL/L (ref 0.4–2)
LYMPHOCYTES # BLD AUTO: 2.99 X10(3)/MCL (ref 1.16–3.74)
LYMPHOCYTES NFR BLD AUTO: 25.2 % (ref 20–55)
MAGNESIUM SERPL-MCNC: 1.9 MG/DL (ref 1.8–2.4)
MCH RBC QN AUTO: 29.7 PG (ref 27–34)
MCHC RBC AUTO-ENTMCNC: 32.8 G/DL (ref 31–37)
MCV RBC AUTO: 90.6 FL (ref 79–99)
METHGB MFR BLDA: 0.7 % (ref 0–1.5)
MONOCYTES # BLD AUTO: 0.59 X10(3)/MCL (ref 0.24–0.36)
MONOCYTES NFR BLD AUTO: 5 % (ref 4.7–12.5)
NEUTROPHILS # BLD AUTO: 8.09 X10(3)/MCL (ref 1.56–6.13)
NEUTROPHILS NFR BLD AUTO: 68.3 % (ref 37–73)
OHS QRS DURATION: 92 MS
OHS QTC CALCULATION: 411 MS
OXYGEN DEVICE BLOOD GAS (OHS): ABNORMAL
PCO2 BLDA: 30.1 MMHG (ref 35–45)
PH BLDA: 7.56 [PH] (ref 7.35–7.45)
PLATELET # BLD AUTO: 427 X10(3)/MCL (ref 140–371)
PMV BLD AUTO: 8.1 FL (ref 9.4–12.4)
PO2 BLDA: 85.1 MMHG (ref 80–105)
POTASSIUM SERPL-SCNC: 3.8 MMOL/L (ref 3.5–5.1)
PROT SERPL-MCNC: 8.1 GM/DL (ref 6.3–8.2)
RBC # BLD AUTO: 4.68 X10(6)/MCL (ref 4–5.1)
SAO2 % BLDA: 98.6 % (ref 95–100)
SODIUM SERPL-SCNC: 138 MMOL/L (ref 135–145)
WBC # SPEC AUTO: 11.85 X10(3)/MCL (ref 4–11.5)

## 2024-03-13 PROCEDURE — 87400 INFLUENZA A/B EACH AG IA: CPT | Performed by: FAMILY MEDICINE

## 2024-03-13 PROCEDURE — 80053 COMPREHEN METABOLIC PANEL: CPT | Performed by: FAMILY MEDICINE

## 2024-03-13 PROCEDURE — 87040 BLOOD CULTURE FOR BACTERIA: CPT | Mod: 59 | Performed by: FAMILY MEDICINE

## 2024-03-13 PROCEDURE — 82803 BLOOD GASES ANY COMBINATION: CPT

## 2024-03-13 PROCEDURE — 83880 ASSAY OF NATRIURETIC PEPTIDE: CPT | Performed by: FAMILY MEDICINE

## 2024-03-13 PROCEDURE — 85379 FIBRIN DEGRADATION QUANT: CPT | Performed by: FAMILY MEDICINE

## 2024-03-13 PROCEDURE — 85025 COMPLETE CBC W/AUTO DIFF WBC: CPT | Performed by: FAMILY MEDICINE

## 2024-03-13 PROCEDURE — 93010 ELECTROCARDIOGRAM REPORT: CPT | Mod: ,,, | Performed by: INTERNAL MEDICINE

## 2024-03-13 PROCEDURE — 63600175 PHARM REV CODE 636 W HCPCS: Performed by: FAMILY MEDICINE

## 2024-03-13 PROCEDURE — 93005 ELECTROCARDIOGRAM TRACING: CPT

## 2024-03-13 PROCEDURE — 94761 N-INVAS EAR/PLS OXIMETRY MLT: CPT | Mod: XB

## 2024-03-13 PROCEDURE — 36600 WITHDRAWAL OF ARTERIAL BLOOD: CPT

## 2024-03-13 PROCEDURE — 94640 AIRWAY INHALATION TREATMENT: CPT

## 2024-03-13 PROCEDURE — 25000242 PHARM REV CODE 250 ALT 637 W/ HCPCS: Performed by: FAMILY MEDICINE

## 2024-03-13 PROCEDURE — 96375 TX/PRO/DX INJ NEW DRUG ADDON: CPT

## 2024-03-13 PROCEDURE — 96374 THER/PROPH/DIAG INJ IV PUSH: CPT

## 2024-03-13 PROCEDURE — 99900035 HC TECH TIME PER 15 MIN (STAT)

## 2024-03-13 PROCEDURE — 83605 ASSAY OF LACTIC ACID: CPT | Performed by: FAMILY MEDICINE

## 2024-03-13 PROCEDURE — 83735 ASSAY OF MAGNESIUM: CPT | Performed by: FAMILY MEDICINE

## 2024-03-13 PROCEDURE — 99285 EMERGENCY DEPT VISIT HI MDM: CPT | Mod: 25

## 2024-03-13 RX ORDER — METHYLPREDNISOLONE SOD SUCC 125 MG
125 VIAL (EA) INJECTION
Status: COMPLETED | OUTPATIENT
Start: 2024-03-13 | End: 2024-03-13

## 2024-03-13 RX ORDER — METHYLPREDNISOLONE 4 MG/1
TABLET ORAL
Qty: 21 EACH | Refills: 0 | Status: SHIPPED | OUTPATIENT
Start: 2024-03-13

## 2024-03-13 RX ORDER — LORAZEPAM 2 MG/ML
1 INJECTION INTRAMUSCULAR
Status: COMPLETED | OUTPATIENT
Start: 2024-03-13 | End: 2024-03-13

## 2024-03-13 RX ORDER — DIAZEPAM 5 MG/1
5 TABLET ORAL EVERY 8 HOURS PRN
Qty: 15 TABLET | Refills: 0 | Status: SHIPPED | OUTPATIENT
Start: 2024-03-13 | End: 2024-04-12

## 2024-03-13 RX ORDER — IPRATROPIUM BROMIDE AND ALBUTEROL SULFATE 2.5; .5 MG/3ML; MG/3ML
3 SOLUTION RESPIRATORY (INHALATION)
Status: COMPLETED | OUTPATIENT
Start: 2024-03-13 | End: 2024-03-13

## 2024-03-13 RX ORDER — IPRATROPIUM BROMIDE AND ALBUTEROL SULFATE 2.5; .5 MG/3ML; MG/3ML
3 SOLUTION RESPIRATORY (INHALATION) EVERY 6 HOURS PRN
Qty: 75 ML | Refills: 0 | Status: SHIPPED | OUTPATIENT
Start: 2024-03-13 | End: 2025-03-13

## 2024-03-13 RX ORDER — BUDESONIDE 0.5 MG/2ML
0.5 INHALANT ORAL ONCE
Status: COMPLETED | OUTPATIENT
Start: 2024-03-13 | End: 2024-03-13

## 2024-03-13 RX ADMIN — LORAZEPAM 1 MG: 2 INJECTION INTRAMUSCULAR; INTRAVENOUS at 12:03

## 2024-03-13 RX ADMIN — IPRATROPIUM BROMIDE AND ALBUTEROL SULFATE 3 ML: 2.5; .5 SOLUTION RESPIRATORY (INHALATION) at 11:03

## 2024-03-13 RX ADMIN — METHYLPREDNISOLONE SODIUM SUCCINATE 125 MG: 125 INJECTION, POWDER, FOR SOLUTION INTRAMUSCULAR; INTRAVENOUS at 11:03

## 2024-03-13 RX ADMIN — IPRATROPIUM BROMIDE AND ALBUTEROL SULFATE 3 ML: 2.5; .5 SOLUTION RESPIRATORY (INHALATION) at 12:03

## 2024-03-13 RX ADMIN — BUDESONIDE INHALATION 0.5 MG: 0.5 SUSPENSION RESPIRATORY (INHALATION) at 01:03

## 2024-03-13 NOTE — ED PROVIDER NOTES
Encounter Date: 3/13/2024       History     Chief Complaint   Patient presents with    Shortness of Breath    Cough     PRESENTS TO ED PER POV WITH C/O SOB, WEAKNESS, AND POOR APPETITE X1 WEEK. DX: WITH COVID X1 WEEK AGO WITH FAILED O/P TX. SENT BY DR. CALHOUN     62-year-old female with a history of COPD presents emergency room with complaints of shortness of breath.  States about a week ago she was diagnosed with COVID.  States she felt like she was getting better until 2 days ago when increased work of breathing started and patient just reports feeling ill.    The history is provided by the patient.   Shortness of Breath  Associated symptoms include cough. Associated medical issues include COPD.   Cough  The problem has been gradually worsening. Associated symptoms include shortness of breath. Her past medical history is significant for COPD.     Review of patient's allergies indicates:  No Known Allergies  Past Medical History:   Diagnosis Date    COPD (chronic obstructive pulmonary disease)     Depression     Stomach ulcer      Past Surgical History:   Procedure Laterality Date    HERNIA REPAIR      HYSTERECTOMY       Family History   Problem Relation Age of Onset    Cancer Mother      Social History     Tobacco Use    Smoking status: Every Day     Types: Cigarettes    Smokeless tobacco: Never   Substance Use Topics    Alcohol use: Never    Drug use: Never     Review of Systems   Constitutional:  Positive for fatigue.   Respiratory:  Positive for cough and shortness of breath.    All other systems reviewed and are negative.      Physical Exam     Initial Vitals [03/13/24 1051]   BP Pulse Resp Temp SpO2   134/79 100 (!) 22 98.6 °F (37 °C) 99 %      MAP       --         Physical Exam    Nursing note and vitals reviewed.  Constitutional: She appears well-developed and well-nourished. She is not diaphoretic. No distress.   HENT:   Head: Normocephalic and atraumatic.   Nose: Nose normal.   Mouth/Throat: Oropharynx  is clear and moist.   Eyes: Conjunctivae and EOM are normal. Pupils are equal, round, and reactive to light.   Neck: Neck supple. No tracheal deviation present.   Normal range of motion.  Cardiovascular:  Normal rate, intact distal pulses and normal pulses.     Exam reveals no decreased pulses.       Pulmonary/Chest:   Increased work of breathing, scattered wheezes,   Abdominal: Abdomen is soft. She exhibits no distension. There is no abdominal tenderness.   Musculoskeletal:         General: No tenderness. Normal range of motion.      Cervical back: Normal range of motion and neck supple.      Comments: No acute change     Neurological: She is alert and oriented to person, place, and time. GCS score is 15. GCS eye subscore is 4. GCS verbal subscore is 5. GCS motor subscore is 6.   No acute change   Skin: Skin is warm and dry. No rash noted.   Psychiatric: She has a normal mood and affect. Thought content normal.         ED Course   Procedures  Labs Reviewed   BLOOD GAS - Abnormal; Notable for the following components:       Result Value    pH, Blood gas 7.564 (*)     pCO2, Blood gas 30.1 (*)     CO Hgb 7.1 (*)     HCO3, Blood gas 29.4 (*)     Base Excess, Blood gas 5.50 (*)     All other components within normal limits   COMPREHENSIVE METABOLIC PANEL - Abnormal; Notable for the following components:    Blood Urea Nitrogen 26.0 (*)     Alkaline Phosphatase 150 (*)     Alanine Aminotransferase 76 (*)     BUN/Creatinine Ratio 36 (*)     All other components within normal limits   CBC WITH DIFFERENTIAL - Abnormal; Notable for the following components:    WBC 11.85 (*)     Platelet 427 (*)     MPV 8.1 (*)     Neut # 8.09 (*)     Mono # 0.59 (*)     All other components within normal limits   RAPID INFLUENZA A/B - Normal   D DIMER, QUANTITATIVE - Normal   LACTIC ACID, PLASMA - Normal   MAGNESIUM - Normal   NT-PRO NATRIURETIC PEPTIDE - Normal   BLOOD CULTURE OLG   BLOOD CULTURE OLG   CBC W/ AUTO DIFFERENTIAL    Narrative:      The following orders were created for panel order CBC Auto Differential.  Procedure                               Abnormality         Status                     ---------                               -----------         ------                     CBC with Differential[[id number]]       Abnormal            Final result                 Please view results for these tests on the individual orders.     EKG Readings: (Independently Interpreted)   Initial Reading: No STEMI. Rhythm: Normal Sinus Rhythm. Heart Rate: 83. Ectopy: No Ectopy. Conduction: Normal. ST Segments: Normal ST Segments. Clinical Impression: Normal Sinus Rhythm       Imaging Results              X-Ray Chest AP Portable (Final result)  Result time 03/13/24 12:19:12   Procedure changed from X-Ray Chest PA And Lateral     Final result by La Oh III, MD (03/13/24 12:19:12)                   Impression:      1. Chronic changes are present as described above and as seen previously including what appear to be at least mild to moderate changes of COPD.  See above comments.  2. I see no lobar or segmental infiltrates or other significant abnormalities.      Electronically signed by: La Oh  Date:    03/13/2024  Time:    12:19               Narrative:    EXAMINATION:  STUDY: XR CHEST AP PORTABLE    CLINICAL HISTORY AND TECHNIQUE:  Short this of breath    COMPARISON:  01/03/2024    FINDINGS:  Mild to moderate changes of interstitial fibrosis with scattered areas of mild parenchymal scarring are present as seen previously.  The lungs are slightly hyperexpanded.The cardiac, hilar, and mediastinal contours appear unremarkable.I see no lobar or segmental infiltrates.No significant pleural effusions are noted.There is moderate demineralization of the skeletal structures with a mild to moderate kyphoscoliotic curvature of the thoracic spine and mild degenerative changes noted throughout the thoracic spine.                                       Medications    methylPREDNISolone sodium succinate injection 125 mg (125 mg Intravenous Given 3/13/24 1118)   albuterol-ipratropium 2.5 mg-0.5 mg/3 mL nebulizer solution 3 mL (3 mLs Nebulization Given 3/13/24 1124)   LORazepam injection 1 mg (1 mg Intravenous Given 3/13/24 1256)   budesonide nebulizer solution 0.5 mg (0.5 mg Nebulization Given 3/13/24 1301)   albuterol-ipratropium 2.5 mg-0.5 mg/3 mL nebulizer solution 3 mL (3 mLs Nebulization Given 3/13/24 1253)     Medical Decision Making  Patient feeling better after nebulizers and Ativan    Amount and/or Complexity of Data Reviewed  Labs: ordered. Decision-making details documented in ED Course.  Radiology: ordered and independent interpretation performed. Decision-making details documented in ED Course.  ECG/medicine tests: ordered and independent interpretation performed. Decision-making details documented in ED Course.    Risk  Prescription drug management.                                      Clinical Impression:  Final diagnoses:  [R06.00] Dyspnea  [J44.1] COPD with acute exacerbation (Primary)  [F41.9] Anxiety disorder, unspecified type          ED Disposition Condition    Discharge Stable          ED Prescriptions       Medication Sig Dispense Start Date End Date Auth. Provider    albuterol-ipratropium (DUO-NEB) 2.5 mg-0.5 mg/3 mL nebulizer solution Take 3 mLs by nebulization every 6 (six) hours as needed for Wheezing. Rescue 75 mL 3/13/2024 3/13/2025 Abdoulaye Inman MD    methylPREDNISolone (MEDROL DOSEPACK) 4 mg tablet use as directed 21 each 3/13/2024 -- Abdoulaye Inman MD    diazePAM (VALIUM) 5 MG tablet Take 1 tablet (5 mg total) by mouth every 8 (eight) hours as needed for Anxiety. 15 tablet 3/13/2024 4/12/2024 Abdoulaye Inman MD          Follow-up Information       Follow up With Specialties Details Why Contact Info    Vidal Malhotra MD Internal Medicine In 2 days  [address] [facility] [address]  [phone]               Abdoulaye Inman  MD  03/13/24 8654

## 2024-03-13 NOTE — ED NOTES
Pt seen and evaluated by Dr. Inman. Pt has no complaints at this time and verbalized understanding of education. Pt ambulated out of ER with steady gait.  No acute distress noted. See provider notes.

## 2024-03-18 LAB — BACTERIA BLD CULT: NORMAL

## 2024-03-20 LAB — BACTERIA BLD CULT: NORMAL

## 2024-09-03 ENCOUNTER — HOSPITAL ENCOUNTER (EMERGENCY)
Facility: HOSPITAL | Age: 64
Discharge: HOME | End: 2024-09-03
Attending: EMERGENCY MEDICINE
Payer: MEDICARE

## 2024-09-03 VITALS
WEIGHT: 97 LBS | SYSTOLIC BLOOD PRESSURE: 160 MMHG | HEIGHT: 66 IN | RESPIRATION RATE: 17 BRPM | DIASTOLIC BLOOD PRESSURE: 83 MMHG | TEMPERATURE: 99 F | BODY MASS INDEX: 15.59 KG/M2 | OXYGEN SATURATION: 96 % | HEART RATE: 78 BPM

## 2024-09-03 DIAGNOSIS — R07.9 CHEST PAIN: ICD-10-CM

## 2024-09-03 DIAGNOSIS — R06.02 SHORTNESS OF BREATH: ICD-10-CM

## 2024-09-03 DIAGNOSIS — J44.1 COPD EXACERBATION: Primary | ICD-10-CM

## 2024-09-03 LAB
ALBUMIN SERPL-MCNC: 3.8 G/DL (ref 3.4–4.8)
ALBUMIN/GLOB SERPL: 1.3 RATIO (ref 1.1–2)
ALP SERPL-CCNC: 67 UNIT/L (ref 40–150)
ALT SERPL-CCNC: 10 UNIT/L (ref 0–55)
ANION GAP SERPL CALC-SCNC: 10 MEQ/L
AST SERPL-CCNC: 12 UNIT/L (ref 5–34)
BASOPHILS # BLD AUTO: 0.04 X10(3)/MCL
BASOPHILS NFR BLD AUTO: 0.5 %
BILIRUB SERPL-MCNC: 0.1 MG/DL
BNP BLD-MCNC: 28 PG/ML
BUN SERPL-MCNC: 20 MG/DL (ref 9.8–20.1)
CALCIUM SERPL-MCNC: 9.8 MG/DL (ref 8.4–10.2)
CHLORIDE SERPL-SCNC: 105 MMOL/L (ref 98–107)
CO2 SERPL-SCNC: 27 MMOL/L (ref 23–31)
CREAT SERPL-MCNC: 0.75 MG/DL (ref 0.55–1.02)
CREAT/UREA NIT SERPL: 27
EOSINOPHIL # BLD AUTO: 0.24 X10(3)/MCL (ref 0–0.9)
EOSINOPHIL NFR BLD AUTO: 2.8 %
ERYTHROCYTE [DISTWIDTH] IN BLOOD BY AUTOMATED COUNT: 13.5 % (ref 11.5–17)
GFR SERPLBLD CREATININE-BSD FMLA CKD-EPI: >60 ML/MIN/1.73/M2
GLOBULIN SER-MCNC: 2.9 GM/DL (ref 2.4–3.5)
GLUCOSE SERPL-MCNC: 104 MG/DL (ref 82–115)
HCT VFR BLD AUTO: 39.1 % (ref 37–47)
HGB BLD-MCNC: 12.9 G/DL (ref 12–16)
IMM GRANULOCYTES # BLD AUTO: 0.03 X10(3)/MCL (ref 0–0.04)
IMM GRANULOCYTES NFR BLD AUTO: 0.4 %
LYMPHOCYTES # BLD AUTO: 2.72 X10(3)/MCL (ref 0.6–4.6)
LYMPHOCYTES NFR BLD AUTO: 31.9 %
MCH RBC QN AUTO: 29.8 PG (ref 27–31)
MCHC RBC AUTO-ENTMCNC: 33 G/DL (ref 33–36)
MCV RBC AUTO: 90.3 FL (ref 80–94)
MONOCYTES # BLD AUTO: 0.56 X10(3)/MCL (ref 0.1–1.3)
MONOCYTES NFR BLD AUTO: 6.6 %
NEUTROPHILS # BLD AUTO: 4.94 X10(3)/MCL (ref 2.1–9.2)
NEUTROPHILS NFR BLD AUTO: 57.8 %
OHS QRS DURATION: 92 MS
OHS QTC CALCULATION: 425 MS
PLATELET # BLD AUTO: 350 X10(3)/MCL (ref 130–400)
PMV BLD AUTO: 8.1 FL (ref 7.4–10.4)
POTASSIUM SERPL-SCNC: 3.5 MMOL/L (ref 3.5–5.1)
PROT SERPL-MCNC: 6.7 GM/DL (ref 5.8–7.6)
RBC # BLD AUTO: 4.33 X10(6)/MCL (ref 4.2–5.4)
SODIUM SERPL-SCNC: 142 MMOL/L (ref 136–145)
TROPONIN I SERPL-MCNC: <0.01 NG/ML (ref 0–0.04)
WBC # BLD AUTO: 8.53 X10(3)/MCL (ref 4.5–11.5)

## 2024-09-03 PROCEDURE — 80053 COMPREHEN METABOLIC PANEL: CPT | Performed by: PHYSICIAN ASSISTANT

## 2024-09-03 PROCEDURE — 93005 ELECTROCARDIOGRAM TRACING: CPT

## 2024-09-03 PROCEDURE — 85025 COMPLETE CBC W/AUTO DIFF WBC: CPT | Performed by: PHYSICIAN ASSISTANT

## 2024-09-03 PROCEDURE — 63600175 PHARM REV CODE 636 W HCPCS: Performed by: PHYSICIAN ASSISTANT

## 2024-09-03 PROCEDURE — 83880 ASSAY OF NATRIURETIC PEPTIDE: CPT | Performed by: PHYSICIAN ASSISTANT

## 2024-09-03 PROCEDURE — 99285 EMERGENCY DEPT VISIT HI MDM: CPT | Mod: 25

## 2024-09-03 PROCEDURE — 96372 THER/PROPH/DIAG INJ SC/IM: CPT | Performed by: PHYSICIAN ASSISTANT

## 2024-09-03 PROCEDURE — 84484 ASSAY OF TROPONIN QUANT: CPT | Performed by: PHYSICIAN ASSISTANT

## 2024-09-03 PROCEDURE — 93010 ELECTROCARDIOGRAM REPORT: CPT | Mod: ,,, | Performed by: INTERNAL MEDICINE

## 2024-09-03 RX ORDER — METHYLPREDNISOLONE SOD SUCC 125 MG
125 VIAL (EA) INJECTION
Status: COMPLETED | OUTPATIENT
Start: 2024-09-03 | End: 2024-09-03

## 2024-09-03 RX ORDER — DOXYCYCLINE 100 MG/1
100 CAPSULE ORAL 2 TIMES DAILY
Qty: 14 CAPSULE | Refills: 0 | Status: SHIPPED | OUTPATIENT
Start: 2024-09-03 | End: 2024-09-10

## 2024-09-03 RX ADMIN — METHYLPREDNISOLONE SODIUM SUCCINATE 125 MG: 125 INJECTION, POWDER, FOR SOLUTION INTRAMUSCULAR; INTRAVENOUS at 05:09

## 2024-09-03 NOTE — ED PROVIDER NOTES
Encounter Date: 9/3/2024       History     Chief Complaint   Patient presents with    Shortness of Breath    Chest Pain     C/o chest pain and SOB for few days     64-year-old female with history of COPD presents to ED for evaluation of shortness of breath and chest pain worsening over the last few days.  Patient reports that she was a chronic cough and congestion.  States she has been coughing up a gray mucus over the last 3 weeks.  Reports that she was recently seen and told she has a lung nodule in his currently being worked up for this.  States however over the last couple days she was started having pain in her chest which concerned her prompting her for further evaluation.  Denies any fever.    The history is provided by the patient. No  was used.     Review of patient's allergies indicates:  No Known Allergies  Past Medical History:   Diagnosis Date    COPD (chronic obstructive pulmonary disease)     Depression     Stomach ulcer      Past Surgical History:   Procedure Laterality Date    HERNIA REPAIR      HYSTERECTOMY       Family History   Problem Relation Name Age of Onset    Cancer Mother       Social History     Tobacco Use    Smoking status: Every Day     Types: Cigarettes    Smokeless tobacco: Never   Substance Use Topics    Alcohol use: Never    Drug use: Never     Review of Systems   Constitutional:  Negative for diaphoresis and fever.   HENT:  Negative for congestion, rhinorrhea and sore throat.    Respiratory:  Positive for cough and shortness of breath.    Cardiovascular:  Positive for chest pain. Negative for palpitations and leg swelling.   Gastrointestinal:  Negative for abdominal pain, constipation, diarrhea, nausea and vomiting.   Genitourinary: Negative.    Musculoskeletal: Negative.    Skin: Negative.    Neurological:  Negative for syncope, weakness, light-headedness and numbness.   All other systems reviewed and are negative.      Physical Exam     Initial Vitals  [09/03/24 1443]   BP Pulse Resp Temp SpO2   (!) 150/63 93 18 99 °F (37.2 °C) 95 %      MAP       --         Physical Exam    Nursing note and vitals reviewed.  Constitutional: She appears well-developed and well-nourished.   HENT:   Head: Normocephalic and atraumatic.   Right Ear: Tympanic membrane and external ear normal.   Left Ear: Tympanic membrane and external ear normal.   Mouth/Throat: Uvula is midline, oropharynx is clear and moist and mucous membranes are normal. No trismus in the jaw. No uvula swelling. No oropharyngeal exudate, posterior oropharyngeal edema or posterior oropharyngeal erythema.   Eyes: Conjunctivae are normal. Pupils are equal, round, and reactive to light.   Neck: Neck supple.   Normal range of motion.  Cardiovascular:  Normal rate, regular rhythm and normal heart sounds.           Pulmonary/Chest: Breath sounds normal. She has no wheezes. She has no rhonchi. She has no rales. She exhibits no tenderness.   Abdominal: Abdomen is soft. Bowel sounds are normal. There is no abdominal tenderness. There is no rebound and no guarding.   Musculoskeletal:         General: Normal range of motion.      Cervical back: Normal range of motion and neck supple.     Neurological: She is alert and oriented to person, place, and time.   Skin: Skin is warm and dry.   Psychiatric: She has a normal mood and affect.         ED Course   Procedures  Labs Reviewed   B-TYPE NATRIURETIC PEPTIDE - Normal       Result Value    Natriuretic Peptide 28.0     TROPONIN I - Normal    Troponin-I <0.010     CBC W/ AUTO DIFFERENTIAL    Narrative:     The following orders were created for panel order CBC auto differential.  Procedure                               Abnormality         Status                     ---------                               -----------         ------                     CBC with Differential[[id number]]                           Final result                 Please view results for these tests on the  individual orders.   COMPREHENSIVE METABOLIC PANEL    Sodium 142      Potassium 3.5      Chloride 105      CO2 27      Glucose 104      Blood Urea Nitrogen 20.0      Creatinine 0.75      Calcium 9.8      Protein Total 6.7      Albumin 3.8      Globulin 2.9      Albumin/Globulin Ratio 1.3      Bilirubin Total 0.1      ALP 67      ALT 10      AST 12      eGFR >60      Anion Gap 10.0      BUN/Creatinine Ratio 27     CBC WITH DIFFERENTIAL    WBC 8.53      RBC 4.33      Hgb 12.9      Hct 39.1      MCV 90.3      MCH 29.8      MCHC 33.0      RDW 13.5      Platelet 350      MPV 8.1      Neut % 57.8      Lymph % 31.9      Mono % 6.6      Eos % 2.8      Basophil % 0.5      Lymph # 2.72      Neut # 4.94      Mono # 0.56      Eos # 0.24      Baso # 0.04      IG# 0.03      IG% 0.4       EKG Readings: (Independently Interpreted)   Initial Reading: No STEMI. Rhythm: Normal Sinus Rhythm. Heart Rate: 93. Ectopy: No Ectopy. Conduction: Normal. ST Segments: Normal ST Segments. T Waves: Normal. Clinical Impression: Normal Sinus Rhythm     ECG Results              EKG 12-lead (In process)        Collection Time Result Time QRS Duration OHS QTC Calculation    09/03/24 14:42:14 09/03/24 16:14:17 92 425                     In process by Interface, Lab In [facility] (09/03/24 16:14:23)                   Narrative:    Test Reason : R07.9,    Vent. Rate : 093 BPM     Atrial Rate : 093 BPM     P-R Int : 134 ms          QRS Dur : 092 ms      QT Int : 342 ms       P-R-T Axes : 081 070 074 degrees     QTc Int : 425 ms    Normal sinus rhythm  Cannot rule out Anterior infarct ,age undetermined  Abnormal ECG  When compared with ECG of 13-MAR-2024 11:08,  No significant change was found    Referred By: [email or web address]   SELF           Confirmed By:                                   Imaging Results              X-Ray Chest 1 View (Final result)  Result time 09/03/24 16:52:03      Final result by Sander Montana Jr., MD (09/03/24 16:52:03)                    Impression:      1. Increased interstitial markings with bibasilar fibrotic change present.  No pneumothorax or lobar type consolidation.  Overall, stable appearance of the chest when compared to the exam.      Electronically signed by: Sander Montana MD  Date:    09/03/2024  Time:    16:52               Narrative:    EXAMINATION:  XR CHEST 1 VIEW    CLINICAL HISTORY:  Shortness of breath    TECHNIQUE:  Single frontal view of the chest was performed.    COMPARISON:  03/13/2024    FINDINGS:  The cardiac silhouette is normal in size.  The lungs are hyperinflated with flattened hemidiaphragms.  Fibrotic changes are present throughout both lung fields.  There is no pneumothorax  nor lobar type consolidation.                                       Medications   methylPREDNISolone sodium succinate injection 125 mg (125 mg Intramuscular Given 9/3/24 1716)     Medical Decision Making  64-year-old female with history of COPD presents to ED for evaluation of shortness of breath and chest pain worsening over the last few days.  Patient reports that she was a chronic cough and congestion.  States she has been coughing up a gray mucus over the last 3 weeks.  Reports that she was recently seen and told she has a lung nodule in his currently being worked up for this.  States however over the last couple days she was started having pain in her chest which concerned her prompting her for further evaluation.  Denies any fever.    Differential Diagnosis includes, but is not limited to:  ACS/MI, PE, aortic dissection, pneumothorax, cardiac tamponade, pericarditis/myocarditis, pneumonia, infection/abscess, lung mass, trauma/fracture, costochondritis/pleurisy, MSK pain/contusion, GERD, biliary disease, pancreatitis, anemia      Amount and/or Complexity of Data Reviewed  Labs: ordered.  Radiology: ordered.  ECG/medicine tests: ordered and independent interpretation performed.  Discussion of management or test interpretation with  external provider(s): Patient presents to ED for evaluation of cough, congestion, shortness of breath and chest pain worsening over the last few days.  Patient has a history of COPD and states it was chronic however she has been coughing up more green mucus than normal.  States been going on for the last 3 weeks.  Chest x-ray obtained showing fibrotic changes without any acute consolidation.  Labs unremarkable.  EKG showing normal sinus rhythm.  Patient given dose of Solu-Medrol for pain and inflammation.  Patient's SpO2 is % in room.  Will place on antibiotics to cover for possible infection as patient's symptoms have been going on for 3 weeks as well as her significant history of COPD.  Patient is currently being worked up for a lung nodule.  States that she was awaiting a CT and PET scan.  Discussed importance of follow-up.  Discussed return ED precautions.  Patient verbalizes understanding and agrees with plan care    Risk  Prescription drug management.                                      Clinical Impression:  Final diagnoses:  [R07.9] Chest pain  [R06.02] Shortness of breath  [J44.1] COPD exacerbation (Primary)          ED Disposition Condition    Discharge Stable          ED Prescriptions    None       Follow-up Information       Follow up With Specialties Details Why Contact Info    Vidal Malhotra MD Internal Medicine Call   [address] [facility] [address]  [phone]               Malia Mckeon PA  09/03/24 1588

## 2024-09-03 NOTE — DISCHARGE INSTRUCTIONS
Drink plenty of fluids.  Continue home nebulizer and inhaler treatments.  Take full course of antibiotics.  Follow up with PCP in 7-10 days as needed.

## 2024-09-23 ENCOUNTER — HOSPITAL ENCOUNTER (OUTPATIENT)
Dept: RADIOLOGY | Facility: HOSPITAL | Age: 64
Discharge: HOME | End: 2024-09-23
Payer: MEDICARE

## 2024-09-23 DIAGNOSIS — R05.9 COUGH: ICD-10-CM

## 2024-09-23 PROCEDURE — 71046 X-RAY EXAM CHEST 2 VIEWS: CPT | Mod: TC

## 2024-09-24 DIAGNOSIS — R91.1 SOLITARY PULMONARY NODULE: Primary | ICD-10-CM

## 2024-09-30 ENCOUNTER — HOSPITAL ENCOUNTER (OUTPATIENT)
Dept: RADIOLOGY | Facility: HOSPITAL | Age: 64
Discharge: HOME | End: 2024-09-30
Payer: MEDICARE

## 2024-09-30 ENCOUNTER — CLINICAL SUPPORT (OUTPATIENT)
Dept: RESPIRATORY THERAPY | Facility: HOSPITAL | Age: 64
End: 2024-09-30
Payer: MEDICARE

## 2024-09-30 ENCOUNTER — LAB VISIT (OUTPATIENT)
Dept: LAB | Facility: HOSPITAL | Age: 64
End: 2024-09-30
Payer: MEDICARE

## 2024-09-30 DIAGNOSIS — M54.50 LOW BACK PAIN: ICD-10-CM

## 2024-09-30 DIAGNOSIS — H10.401 CHRONIC CONJUNCTIVITIS OF RIGHT EYE, UNSPECIFIED CHRONIC CONJUNCTIVITIS TYPE: Primary | ICD-10-CM

## 2024-09-30 DIAGNOSIS — R53.81 MALAISE AND FATIGUE: Primary | ICD-10-CM

## 2024-09-30 DIAGNOSIS — M54.6 PAIN IN THORACIC SPINE: ICD-10-CM

## 2024-09-30 DIAGNOSIS — R53.83 MALAISE AND FATIGUE: Primary | ICD-10-CM

## 2024-09-30 DIAGNOSIS — R53.83 MALAISE AND FATIGUE: ICD-10-CM

## 2024-09-30 DIAGNOSIS — R93.89 ABNORMAL FINDINGS ON DIAGNOSTIC IMAGING OF OTHER SPECIFIED BODY STRUCTURES: ICD-10-CM

## 2024-09-30 DIAGNOSIS — R53.81 MALAISE AND FATIGUE: ICD-10-CM

## 2024-09-30 LAB
ALBUMIN SERPL-MCNC: 4.1 G/DL (ref 3.4–4.8)
ALBUMIN/GLOB SERPL: 1.4 RATIO (ref 1.1–2)
ALP SERPL-CCNC: 95 UNIT/L (ref 40–150)
ALT SERPL-CCNC: 20 UNIT/L (ref 0–55)
ANION GAP SERPL CALC-SCNC: 8 MEQ/L
AST SERPL-CCNC: 16 UNIT/L (ref 5–34)
BASOPHILS # BLD AUTO: 0.05 X10(3)/MCL
BASOPHILS NFR BLD AUTO: 0.7 %
BILIRUB SERPL-MCNC: 0.4 MG/DL
BUN SERPL-MCNC: 14 MG/DL (ref 9.8–20.1)
CALCIUM SERPL-MCNC: 9.9 MG/DL (ref 8.4–10.2)
CHLORIDE SERPL-SCNC: 103 MMOL/L (ref 98–107)
CO2 SERPL-SCNC: 27 MMOL/L (ref 23–31)
CREAT SERPL-MCNC: 0.75 MG/DL (ref 0.55–1.02)
CREAT/UREA NIT SERPL: 19
EOSINOPHIL # BLD AUTO: 0.16 X10(3)/MCL (ref 0–0.9)
EOSINOPHIL NFR BLD AUTO: 2.2 %
ERYTHROCYTE [DISTWIDTH] IN BLOOD BY AUTOMATED COUNT: 13.2 % (ref 11.5–17)
GFR SERPLBLD CREATININE-BSD FMLA CKD-EPI: >60 ML/MIN/1.73/M2
GLOBULIN SER-MCNC: 3 GM/DL (ref 2.4–3.5)
GLUCOSE SERPL-MCNC: 91 MG/DL (ref 82–115)
HCT VFR BLD AUTO: 40.1 % (ref 37–47)
HGB BLD-MCNC: 13.2 G/DL (ref 12–16)
IMM GRANULOCYTES # BLD AUTO: 0.02 X10(3)/MCL (ref 0–0.04)
IMM GRANULOCYTES NFR BLD AUTO: 0.3 %
LYMPHOCYTES # BLD AUTO: 2.43 X10(3)/MCL (ref 0.6–4.6)
LYMPHOCYTES NFR BLD AUTO: 32.7 %
MCH RBC QN AUTO: 30.3 PG (ref 27–31)
MCHC RBC AUTO-ENTMCNC: 32.9 G/DL (ref 33–36)
MCV RBC AUTO: 92 FL (ref 80–94)
MONOCYTES # BLD AUTO: 0.54 X10(3)/MCL (ref 0.1–1.3)
MONOCYTES NFR BLD AUTO: 7.3 %
NEUTROPHILS # BLD AUTO: 4.24 X10(3)/MCL (ref 2.1–9.2)
NEUTROPHILS NFR BLD AUTO: 56.8 %
OHS QRS DURATION: 94 MS
OHS QTC CALCULATION: 420 MS
PLATELET # BLD AUTO: 385 X10(3)/MCL (ref 130–400)
PMV BLD AUTO: 8.6 FL (ref 7.4–10.4)
POTASSIUM SERPL-SCNC: 3.4 MMOL/L (ref 3.5–5.1)
PROT SERPL-MCNC: 7.1 GM/DL (ref 5.8–7.6)
RBC # BLD AUTO: 4.36 X10(6)/MCL (ref 4.2–5.4)
SODIUM SERPL-SCNC: 138 MMOL/L (ref 136–145)
TSH SERPL-ACNC: 0.38 UIU/ML (ref 0.35–4.94)
WBC # BLD AUTO: 7.44 X10(3)/MCL (ref 4.5–11.5)

## 2024-09-30 PROCEDURE — 80053 COMPREHEN METABOLIC PANEL: CPT

## 2024-09-30 PROCEDURE — 85025 COMPLETE CBC W/AUTO DIFF WBC: CPT

## 2024-09-30 PROCEDURE — 93005 ELECTROCARDIOGRAM TRACING: CPT

## 2024-09-30 PROCEDURE — 72100 X-RAY EXAM L-S SPINE 2/3 VWS: CPT | Mod: TC

## 2024-09-30 PROCEDURE — 36415 COLL VENOUS BLD VENIPUNCTURE: CPT

## 2024-09-30 PROCEDURE — 93010 ELECTROCARDIOGRAM REPORT: CPT | Mod: ,,, | Performed by: STUDENT IN AN ORGANIZED HEALTH CARE EDUCATION/TRAINING PROGRAM

## 2024-09-30 PROCEDURE — 84443 ASSAY THYROID STIM HORMONE: CPT

## 2024-09-30 PROCEDURE — 72070 X-RAY EXAM THORAC SPINE 2VWS: CPT | Mod: TC

## 2024-10-02 ENCOUNTER — HOSPITAL ENCOUNTER (OUTPATIENT)
Dept: RADIOLOGY | Facility: HOSPITAL | Age: 64
Discharge: HOME | End: 2024-10-02
Payer: MEDICARE

## 2024-10-02 DIAGNOSIS — R91.1 SOLITARY PULMONARY NODULE: ICD-10-CM

## 2024-10-02 PROCEDURE — 71250 CT THORAX DX C-: CPT | Mod: TC

## 2024-10-03 DIAGNOSIS — R93.2 ABNORMAL FINDINGS ON DIAGNOSTIC IMAGING OF LIVER AND BILIARY TRACT: Primary | ICD-10-CM

## 2024-10-10 ENCOUNTER — HOSPITAL ENCOUNTER (OUTPATIENT)
Dept: RADIOLOGY | Facility: HOSPITAL | Age: 64
Discharge: HOME | End: 2024-10-10
Payer: MEDICARE

## 2024-10-10 DIAGNOSIS — R93.2 ABNORMAL FINDINGS ON DIAGNOSTIC IMAGING OF LIVER AND BILIARY TRACT: ICD-10-CM

## 2024-10-10 DIAGNOSIS — J44.9 COPD (CHRONIC OBSTRUCTIVE PULMONARY DISEASE): Primary | ICD-10-CM

## 2024-10-10 PROCEDURE — 76705 ECHO EXAM OF ABDOMEN: CPT | Mod: TC

## 2024-10-14 ENCOUNTER — CLINICAL SUPPORT (OUTPATIENT)
Dept: RESPIRATORY THERAPY | Facility: HOSPITAL | Age: 64
End: 2024-10-14
Payer: MEDICARE

## 2024-10-14 VITALS — RESPIRATION RATE: 20 BRPM | OXYGEN SATURATION: 97 % | HEART RATE: 86 BPM

## 2024-10-14 DIAGNOSIS — J44.9 COPD (CHRONIC OBSTRUCTIVE PULMONARY DISEASE): ICD-10-CM

## 2024-10-14 LAB
FEF 25 75 LLN: 1.51
FEF 25 75 PRE REF: 21.2 %
FEF 25 75 REF: 2.91
FEF2575CHANGE: 24.1 %
FEF2575POSTPRED: 26.3 %
FET100CHANGE: -8.2 %
FEV1 FVC LLN: 66
FEV1 FVC PRE REF: 72.1 %
FEV1 FVC REF: 79
FEV1 LLN: 1.93
FEV1 PRE REF: 61.2 %
FEV1 REF: 2.58
FEV1CHANGE: 11.3 %
FEV1FVCCHANGE: 5.3 %
FEV1FVCPOSTPRED: 76 %
FEV1POSTPRED: 68.1 %
FVC LLN: 2.48
FVC PRE REF: 84.2 %
FVC REF: 3.3
FVCCHANGE: 5.6 %
FVCPOSTPRED: 88.9 %
PEF LLN: 4.67
PEF PRE REF: 57 %
PEF REF: 6.49
PEFCHANGE: -4.7 %
PEFPOSTPRED: 54.3 %
POST FEF 25 75: 0.77 L/S (ref 1.51–4.31)
POST FET 100: 7.59 SEC
POST FEV1 FVC: 59.82 % (ref 66.46–89.27)
POST FEV1: 1.76 L (ref 1.93–3.21)
POST FVC: 2.94 L (ref 2.48–4.17)
POST PEF: 3.52 L/S (ref 4.67–8.31)
PRE FEF 25 75: 0.62 L/S (ref 1.51–4.31)
PRE FET 100: 8.27 SEC
PRE FEV1 FVC: 56.79 % (ref 66.46–89.27)
PRE FEV1: 1.58 L (ref 1.93–3.21)
PRE FVC: 2.78 L (ref 2.48–4.17)
PRE PEF: 3.7 L/S (ref 4.67–8.31)

## 2024-10-14 PROCEDURE — 94760 N-INVAS EAR/PLS OXIMETRY 1: CPT

## 2024-10-14 PROCEDURE — 94640 AIRWAY INHALATION TREATMENT: CPT | Mod: XB

## 2024-10-14 PROCEDURE — 94010 BREATHING CAPACITY TEST: CPT

## 2024-10-14 RX ORDER — ALBUTEROL SULFATE 0.83 MG/ML
2.5 SOLUTION RESPIRATORY (INHALATION)
Status: COMPLETED | OUTPATIENT
Start: 2024-10-14 | End: 2024-10-14

## 2024-10-14 RX ADMIN — ALBUTEROL SULFATE 2.5 MG: 0.83 SOLUTION RESPIRATORY (INHALATION) at 01:10

## 2024-10-22 ENCOUNTER — PATIENT MESSAGE (OUTPATIENT)
Dept: RESEARCH | Facility: HOSPITAL | Age: 64
End: 2024-10-22
Payer: MEDICARE

## 2025-02-21 ENCOUNTER — HOSPITAL ENCOUNTER (EMERGENCY)
Facility: HOSPITAL | Age: 65
Discharge: HOME | End: 2025-02-21
Attending: EMERGENCY MEDICINE
Payer: MEDICARE

## 2025-02-21 VITALS
WEIGHT: 98 LBS | DIASTOLIC BLOOD PRESSURE: 70 MMHG | SYSTOLIC BLOOD PRESSURE: 109 MMHG | RESPIRATION RATE: 18 BRPM | BODY MASS INDEX: 19.24 KG/M2 | HEIGHT: 60 IN | HEART RATE: 65 BPM | OXYGEN SATURATION: 98 % | TEMPERATURE: 98 F

## 2025-02-21 DIAGNOSIS — S60.222A CONTUSION OF LEFT HAND, INITIAL ENCOUNTER: Primary | ICD-10-CM

## 2025-02-21 PROCEDURE — 99283 EMERGENCY DEPT VISIT LOW MDM: CPT | Mod: 25

## 2025-02-21 RX ORDER — DICLOFENAC SODIUM 50 MG/1
50 TABLET, DELAYED RELEASE ORAL 3 TIMES DAILY PRN
Qty: 15 TABLET | Refills: 0 | Status: SHIPPED | OUTPATIENT
Start: 2025-02-21 | End: 2025-02-26

## 2025-02-21 NOTE — ED PROVIDER NOTES
Encounter Date: 2/21/2025       History     Chief Complaint   Patient presents with    Hand Injury     L hand injury a month ago after a trip and fall     See MDM    The history is provided by the patient. No  was used.     Review of patient's allergies indicates:  No Known Allergies  Past Medical History:   Diagnosis Date    COPD (chronic obstructive pulmonary disease)     Depression     Stomach ulcer      Past Surgical History:   Procedure Laterality Date    HERNIA REPAIR      HYSTERECTOMY       Family History   Problem Relation Name Age of Onset    Cancer Mother       Social History[1]  Review of Systems   Constitutional:  Negative for fever.   Respiratory:  Negative for cough and shortness of breath.    Cardiovascular:  Negative for chest pain.   Gastrointestinal:  Negative for abdominal pain.   Genitourinary:  Negative for difficulty urinating and dysuria.   Musculoskeletal:  Negative for gait problem.   Skin:  Negative for color change.   Neurological:  Negative for dizziness, speech difficulty and headaches.   Psychiatric/Behavioral:  Negative for hallucinations and suicidal ideas.    All other systems reviewed and are negative.      Physical Exam     Initial Vitals   BP Pulse Resp Temp SpO2   02/21/25 1526 02/21/25 1525 02/21/25 1525 02/21/25 1525 02/21/25 1525   104/64 78 20 98 °F (36.7 °C) 97 %      MAP       --                Physical Exam    Nursing note and vitals reviewed.  Constitutional: She appears well-developed and well-nourished.   HENT:   Head: Normocephalic.   Eyes: EOM are normal.   Neck:   Normal range of motion.  Cardiovascular:  Normal rate, regular rhythm, normal heart sounds and intact distal pulses.           Pulmonary/Chest: Breath sounds normal. No respiratory distress.   Abdominal: Abdomen is soft. Bowel sounds are normal. There is no abdominal tenderness.   Musculoskeletal:         General: Normal range of motion.      Cervical back: Normal range of motion.       Comments: Swelling to the mid hand     Neurological: She is alert and oriented to person, place, and time. She has normal strength.   Skin: Skin is warm and dry.   Psychiatric: She has a normal mood and affect. Her behavior is normal. Judgment and thought content normal.         ED Course   Procedures  Labs Reviewed - No data to display       Imaging Results              X-Ray Hand 3 view Left (Final result)  Result time 02/21/25 16:12:47      Final result by Ishmael Steward MD (02/21/25 16:12:47)                   Impression:      1. No acute osseous defect identified  2. Mild osteoarthritis  3. Osteopenia      Electronically signed by: Ishmael Steward  Date:    02/21/2025  Time:    16:12               Narrative:    EXAMINATION:  XR HAND COMPLETE 3 VIEW LEFT    CLINICAL HISTORY:  ; trauma;.    COMPARISON:  None available.    FINDINGS:  AP, lateral, and oblique views revealno definite fracture or dislocation.  There is narrowing of the DIP and PIP joints.  Bony structures are osteopenic.                                       Medications - No data to display  Medical Decision Making  Historian:  Patient.  Patient is a White 65 y.o. female that presents with injury to left hand that has been present 1 month ago. Associated symptoms nothing. Surrounding information is had a fall while walking her dogs. Exacerbated by nothing. Relieved by nothing. Patient treatment prior to arrival none. Risk factors include none. Other history pertaining to this complaint nothing.   Assessment:  See physical exam.  DD:  Hand contusion, hand fracture  ED Course: History was obtained.  Physical was performed.  X-ray showed no acute findings.  Recommend she follows up with Orthopedics. Medical or surgical consults:  None. Social determinants that affect healthcare:  None.       Amount and/or Complexity of Data Reviewed  Radiology: ordered.     Details: X-ray showed no acute findings                                      Clinical  Impression:  Final diagnoses:  [S60.222A] Contusion of left hand, initial encounter (Primary)          ED Disposition Condition    Discharge Stable          ED Prescriptions       Medication Sig Dispense Start Date End Date Auth. Provider    diclofenac (VOLTAREN) 50 MG EC tablet Take 1 tablet (50 mg total) by mouth 3 (three) times daily as needed (pain). 15 tablet 2/21/2025 2/26/2025 Nader Jc FNP          Follow-up Information       Follow up With Specialties Details Why Contact Info    Ash London MD Orthopedic Surgery Call in 1 week ed follow up [address] [place] [address]  [phone]                 [1]   Social History  Tobacco Use    Smoking status: Every Day     Types: Cigarettes    Smokeless tobacco: Never   Substance Use Topics    Alcohol use: Never    Drug use: Never        Nader Jc FNP  02/21/25 3607

## 2025-05-13 ENCOUNTER — HOSPITAL ENCOUNTER (OUTPATIENT)
Dept: RADIOLOGY | Facility: HOSPITAL | Age: 65
Discharge: HOME | End: 2025-05-13
Payer: MEDICARE

## 2025-05-13 DIAGNOSIS — R05.9 COUGH: ICD-10-CM

## 2025-05-13 DIAGNOSIS — R06.02 SHORTNESS OF BREATH: ICD-10-CM

## 2025-05-13 DIAGNOSIS — R91.1 SOLITARY LUNG NODULE: ICD-10-CM

## 2025-05-13 PROCEDURE — 71046 X-RAY EXAM CHEST 2 VIEWS: CPT | Mod: TC

## 2025-05-22 ENCOUNTER — HOSPITAL ENCOUNTER (EMERGENCY)
Facility: HOSPITAL | Age: 65
Discharge: HOME | End: 2025-05-22
Attending: EMERGENCY MEDICINE
Payer: MEDICARE

## 2025-05-22 VITALS
WEIGHT: 94.19 LBS | SYSTOLIC BLOOD PRESSURE: 149 MMHG | BODY MASS INDEX: 16.69 KG/M2 | RESPIRATION RATE: 20 BRPM | HEIGHT: 63 IN | OXYGEN SATURATION: 95 % | TEMPERATURE: 99 F | DIASTOLIC BLOOD PRESSURE: 82 MMHG | HEART RATE: 93 BPM

## 2025-05-22 DIAGNOSIS — J06.9 VIRAL URI WITH COUGH: ICD-10-CM

## 2025-05-22 DIAGNOSIS — J02.9 SORE THROAT: Primary | ICD-10-CM

## 2025-05-22 LAB
FLUAV AG UPPER RESP QL IA.RAPID: NOT DETECTED
FLUBV AG UPPER RESP QL IA.RAPID: NOT DETECTED
RSV A 5' UTR RNA NPH QL NAA+PROBE: NOT DETECTED
SARS-COV-2 RNA RESP QL NAA+PROBE: NOT DETECTED
STREP A PCR (OHS): NOT DETECTED

## 2025-05-22 PROCEDURE — 63600175 PHARM REV CODE 636 W HCPCS: Performed by: EMERGENCY MEDICINE

## 2025-05-22 PROCEDURE — 96372 THER/PROPH/DIAG INJ SC/IM: CPT | Performed by: EMERGENCY MEDICINE

## 2025-05-22 PROCEDURE — 87651 STREP A DNA AMP PROBE: CPT | Performed by: EMERGENCY MEDICINE

## 2025-05-22 PROCEDURE — 25000003 PHARM REV CODE 250: Performed by: EMERGENCY MEDICINE

## 2025-05-22 PROCEDURE — 0241U COVID/RSV/FLU A&B PCR: CPT | Performed by: EMERGENCY MEDICINE

## 2025-05-22 PROCEDURE — 99284 EMERGENCY DEPT VISIT MOD MDM: CPT | Mod: 25

## 2025-05-22 RX ORDER — DEXAMETHASONE SODIUM PHOSPHATE 4 MG/ML
8 INJECTION, SOLUTION INTRA-ARTICULAR; INTRALESIONAL; INTRAMUSCULAR; INTRAVENOUS; SOFT TISSUE
Status: COMPLETED | OUTPATIENT
Start: 2025-05-22 | End: 2025-05-22

## 2025-05-22 RX ORDER — BENZONATATE 100 MG/1
100 CAPSULE ORAL 3 TIMES DAILY PRN
Qty: 30 CAPSULE | Refills: 0 | Status: SHIPPED | OUTPATIENT
Start: 2025-05-22

## 2025-05-22 RX ORDER — IBUPROFEN 600 MG/1
600 TABLET, FILM COATED ORAL
Status: COMPLETED | OUTPATIENT
Start: 2025-05-22 | End: 2025-05-22

## 2025-05-22 RX ADMIN — DEXAMETHASONE SODIUM PHOSPHATE 8 MG: 4 INJECTION, SOLUTION INTRA-ARTICULAR; INTRALESIONAL; INTRAMUSCULAR; INTRAVENOUS; SOFT TISSUE at 07:05

## 2025-05-22 RX ADMIN — IBUPROFEN 600 MG: 600 TABLET, FILM COATED ORAL at 06:05

## 2025-06-18 ENCOUNTER — CLINICAL SUPPORT (OUTPATIENT)
Dept: RESPIRATORY THERAPY | Facility: HOSPITAL | Age: 65
End: 2025-06-18
Payer: MEDICARE

## 2025-06-18 ENCOUNTER — HOSPITAL ENCOUNTER (OUTPATIENT)
Dept: RADIOLOGY | Facility: HOSPITAL | Age: 65
Discharge: HOME | End: 2025-06-18
Payer: MEDICARE

## 2025-06-18 DIAGNOSIS — R42 DIZZINESS AND GIDDINESS: ICD-10-CM

## 2025-06-18 DIAGNOSIS — M54.2 CERVICALGIA: ICD-10-CM

## 2025-06-18 DIAGNOSIS — M25.511 RIGHT SHOULDER PAIN: ICD-10-CM

## 2025-06-18 DIAGNOSIS — R42 DIZZINESS AND GIDDINESS: Primary | ICD-10-CM

## 2025-06-18 PROCEDURE — 93005 ELECTROCARDIOGRAM TRACING: CPT

## 2025-06-18 PROCEDURE — 93010 ELECTROCARDIOGRAM REPORT: CPT | Mod: ,,, | Performed by: INTERNAL MEDICINE

## 2025-06-18 PROCEDURE — 72040 X-RAY EXAM NECK SPINE 2-3 VW: CPT | Mod: TC

## 2025-06-18 PROCEDURE — 73030 X-RAY EXAM OF SHOULDER: CPT | Mod: TC,RT

## 2025-06-19 LAB
OHS QRS DURATION: 96 MS
OHS QTC CALCULATION: 433 MS

## 2025-07-16 DIAGNOSIS — M54.2 DORSALGIA OF CERVICAL REGION: Primary | ICD-10-CM

## 2025-07-27 ENCOUNTER — HOSPITAL ENCOUNTER (EMERGENCY)
Facility: HOSPITAL | Age: 65
Discharge: HOME | End: 2025-07-27
Payer: MEDICARE

## 2025-07-27 VITALS
RESPIRATION RATE: 18 BRPM | HEART RATE: 72 BPM | TEMPERATURE: 98 F | WEIGHT: 93 LBS | DIASTOLIC BLOOD PRESSURE: 92 MMHG | SYSTOLIC BLOOD PRESSURE: 175 MMHG | BODY MASS INDEX: 16.48 KG/M2 | HEIGHT: 63 IN | OXYGEN SATURATION: 98 %

## 2025-07-27 DIAGNOSIS — K59.03 DRUG-INDUCED CONSTIPATION: Primary | ICD-10-CM

## 2025-07-27 PROCEDURE — 25000003 PHARM REV CODE 250

## 2025-07-27 PROCEDURE — 99284 EMERGENCY DEPT VISIT MOD MDM: CPT | Mod: 25

## 2025-07-27 RX ORDER — DICYCLOMINE HYDROCHLORIDE 20 MG/1
20 TABLET ORAL EVERY 6 HOURS PRN
Qty: 20 TABLET | Refills: 0 | Status: SHIPPED | OUTPATIENT
Start: 2025-07-27

## 2025-07-27 RX ORDER — DICYCLOMINE HYDROCHLORIDE 10 MG/1
20 CAPSULE ORAL
Status: COMPLETED | OUTPATIENT
Start: 2025-07-27 | End: 2025-07-27

## 2025-07-27 RX ORDER — POLYETHYLENE GLYCOL 3350 17 G/17G
17 POWDER, FOR SOLUTION ORAL DAILY
Qty: 510 G | Refills: 0 | Status: SHIPPED | OUTPATIENT
Start: 2025-07-27 | End: 2025-08-26

## 2025-07-27 RX ADMIN — LACTULOSE 30 G: 20 SOLUTION ORAL at 10:07

## 2025-07-27 RX ADMIN — DICYCLOMINE HYDROCHLORIDE 20 MG: 10 CAPSULE ORAL at 10:07

## 2025-07-28 NOTE — DISCHARGE INSTRUCTIONS
Take the lactulose as prescribed until you get results.  Take the Bentyl, Tylenol and ibuprofen as needed for pain.  Once you get results from the lactulose, replace it with MiraLax.  Take MiraLax for the rest of your life.  Call your doctor tomorrow for follow up.

## 2025-07-28 NOTE — ED PROVIDER NOTES
Encounter Date: 7/27/2025       History     Chief Complaint   Patient presents with    Constipation     Pt to ED c/o abdominal pain starting today and constipation, LBM 4 days ago. Took laxatives this morning with no relief.     Abdominal Pain     65-year-old female complains of constipation and abdominal cramping.  Her last bowel movement was 4 days ago.  She is on chronic opioid therapy.    The history is provided by the patient.     Review of patient's allergies indicates:  No Known Allergies  Past Medical History:   Diagnosis Date    COPD (chronic obstructive pulmonary disease)     Depression     Stomach ulcer      Past Surgical History:   Procedure Laterality Date    HERNIA REPAIR      HYSTERECTOMY       Family History   Problem Relation Name Age of Onset    Cancer Mother       Social History[1]  Review of Systems   Constitutional:  Negative for fever.   HENT:  Negative for sore throat.    Respiratory:  Negative for shortness of breath.    Cardiovascular:  Negative for chest pain.   Gastrointestinal:  Positive for abdominal pain and constipation. Negative for nausea.   Genitourinary:  Negative for dysuria.   Musculoskeletal:  Negative for back pain.   Skin:  Negative for rash.   Neurological:  Negative for weakness.   Hematological:  Does not bruise/bleed easily.   All other systems reviewed and are negative.      Physical Exam     Initial Vitals [07/27/25 2225]   BP Pulse Resp Temp SpO2   (!) 185/93 77 18 98.4 °F (36.9 °C) 98 %      MAP       --         Physical Exam    Nursing note and vitals reviewed.  Constitutional: Vital signs are normal. She appears well-developed and well-nourished. She is cooperative.   HENT:   Head: Normocephalic and atraumatic. Mouth/Throat: Oropharynx is clear and moist.   Eyes: Conjunctivae, EOM and lids are normal. Pupils are equal, round, and reactive to light.   Neck: Trachea normal. Neck supple.   Normal range of motion.  Cardiovascular:  Normal rate, regular rhythm, normal  heart sounds and intact distal pulses.           Pulmonary/Chest: Breath sounds normal.   Abdominal: Abdomen is soft. Bowel sounds are normal. She exhibits no distension and no mass. There is no abdominal tenderness. There is no rebound and no guarding.   Musculoskeletal:         General: Normal range of motion.      Cervical back: Normal, normal range of motion and neck supple.      Lumbar back: Normal.     Neurological: She is alert and oriented to person, place, and time. She has normal strength. Coordination normal. GCS score is 15. GCS eye subscore is 4. GCS verbal subscore is 5. GCS motor subscore is 6.   Skin: Skin is warm, dry and intact. Capillary refill takes less than 2 seconds.   Psychiatric: She has a normal mood and affect. Her speech is normal and behavior is normal. Judgment and thought content normal. Cognition and memory are normal.         ED Course   Procedures  Labs Reviewed - No data to display       Imaging Results              CT Abdomen Pelvis  Without Contrast (Final result)  Result time 07/27/25 23:00:41      Final result by Ralph Schneider MD (07/27/25 23:00:41)                   Impression:      Limited study due the lack of oral and IV contrast.    No definite acute disease is seen      Electronically signed by: Ralph Schneider MD  Date:    07/27/2025  Time:    23:00               Narrative:    EXAMINATION:  CT ABDOMEN PELVIS WITHOUT CONTRAST    CLINICAL HISTORY:  Flank pain, kidney stone suspected;Bowel obstruction suspected;Abdominal pain, acute, nonlocalized;    TECHNIQUE:  Low dose axial images, sagittal and coronal reformations were obtained from the lung bases to the pubic symphysis.  No oral contrast was given.    Automatic exposure control (AEC) was utilized for dose reduction.    Dose: 213.7 mGycm    COMPARISON:  03/28/2023    FINDINGS:  There are chronic changes in the lung bases stable from the previous exam.  There is a cystic lesion in the posterior right lobe of the liver  stable from the previous study.  The spleen appears normal.  Pancreas is not well demonstrated.  Biliary system appears normal.  The adrenals are not well seen.  Kidneys appear normal.  Aorta shows calcification without an aneurysm present.  No distal ureteral lithiasis is seen aorta shows calcification without an aneurysm present.    The appendix is not identified                                       Medications   lactulose 20 gram/30 mL solution Soln 30 g (30 g Oral Given 7/27/25 2239)   dicyclomine capsule 20 mg (20 mg Oral Given 7/27/25 2238)     Medical Decision Making  Constipation, benign exam  Differential diagnosis: Bowel obstruction, fecal impaction  Lactulose, CT  No evidence of fecal impaction or obstruction on CT    Amount and/or Complexity of Data Reviewed  Radiology: ordered. Decision-making details documented in ED Course.    Risk  Prescription drug management.               ED Course as of 07/27/25 2307   Sun Jul 27, 2025 2304 CT Abdomen Pelvis  Without Contrast  No evidence of fecal impaction.  No obstruction. [TM]      ED Course User Index  [TM] Rakesh Ramon MD                               Clinical Impression:  Final diagnoses:  [K59.03] Drug-induced constipation (Primary)          ED Disposition Condition    Discharge Good          ED Prescriptions       Medication Sig Dispense Start Date End Date Auth. Provider    lactulose (CHRONULAC) 20 gram/30 mL Soln Take 30 mLs (20 g total) by mouth 3 (three) times daily as needed. 300 mL 7/27/2025 -- Rakesh Ramon MD    dicyclomine (BENTYL) 20 mg tablet Take 1 tablet (20 mg total) by mouth every 6 (six) hours as needed (Abdominal Pain). 20 tablet 7/27/2025 -- Rakesh Ramon MD    polyethylene glycol (GLYCOLAX) 17 gram/dose powder Take 17 g by mouth once daily. 510 g 7/27/2025 8/26/2025 Rakesh Ramon MD          Follow-up Information       Follow up With Specialties Details Why Contact Info    Nataliia Bonilla, [facility]  Call in 1 day  [address] [facility] [address]  [phone]                     [1]   Social History  Tobacco Use    Smoking status: Every Day     Current packs/day: 2.00     Types: Cigarettes    Smokeless tobacco: Never   Vaping Use    Vaping status: Never Used   Substance Use Topics    Alcohol use: Never    Drug use: Never        Rakesh Ramon MD  07/27/25 2053

## 2025-07-29 ENCOUNTER — TELEPHONE (OUTPATIENT)
Dept: NEUROSURGERY | Facility: CLINIC | Age: 65
End: 2025-07-29
Payer: MEDICARE

## 2025-07-29 NOTE — TELEPHONE ENCOUNTER
Patient referred to Dr. Alcala by Nataliia Bonilla NP for dorsalgia of cervical region.    XR Cervical Spine 2-3 Views 6/18/25. Impression:  1. Anterolisthesis of C3 on C4 and C4 on C5  2. Advanced cervical spondylosis  3. Osteopenia  4. Suboptimal visualization of the odontoid on the odontoid views  5. Enlarged adenoid soft tissues

## 2025-08-04 DIAGNOSIS — R91.1 LUNG NODULE: Primary | ICD-10-CM

## 2025-08-07 ENCOUNTER — HOSPITAL ENCOUNTER (OUTPATIENT)
Dept: RADIOLOGY | Facility: HOSPITAL | Age: 65
Discharge: HOME | End: 2025-08-07
Attending: NURSE PRACTITIONER
Payer: MEDICARE

## 2025-08-07 DIAGNOSIS — R91.1 LUNG NODULE: ICD-10-CM

## 2025-08-07 PROCEDURE — 71250 CT THORAX DX C-: CPT | Mod: TC

## 2025-08-13 DIAGNOSIS — R91.1 LUNG NODULE: Primary | ICD-10-CM

## 2025-08-13 DIAGNOSIS — F17.200 SMOKER: ICD-10-CM
